# Patient Record
Sex: FEMALE | Race: WHITE | NOT HISPANIC OR LATINO
[De-identification: names, ages, dates, MRNs, and addresses within clinical notes are randomized per-mention and may not be internally consistent; named-entity substitution may affect disease eponyms.]

---

## 2017-07-14 RX ORDER — CARISOPRODOL 250 MG
0 TABLET ORAL
Qty: 30 | Refills: 0 | COMMUNITY
Start: 2017-07-14

## 2017-07-14 RX ORDER — NORTRIPTYLINE HYDROCHLORIDE 10 MG/1
0 CAPSULE ORAL
Qty: 60 | Refills: 0 | COMMUNITY
Start: 2017-07-14

## 2017-08-06 RX ORDER — CELECOXIB 200 MG/1
0 CAPSULE ORAL
Qty: 35 | Refills: 0 | COMMUNITY
Start: 2017-08-06

## 2017-08-09 VITALS
RESPIRATION RATE: 16 BRPM | OXYGEN SATURATION: 98 % | HEART RATE: 121 BPM | SYSTOLIC BLOOD PRESSURE: 140 MMHG | TEMPERATURE: 100 F | DIASTOLIC BLOOD PRESSURE: 82 MMHG

## 2017-08-09 PROCEDURE — 74177 CT ABD & PELVIS W/CONTRAST: CPT | Mod: 26

## 2017-08-09 RX ORDER — MEROPENEM 1 G/30ML
INJECTION INTRAVENOUS
Qty: 0 | Refills: 0 | Status: DISCONTINUED | OUTPATIENT
Start: 2017-08-09 | End: 2017-08-11

## 2017-08-09 RX ORDER — KETOROLAC TROMETHAMINE 30 MG/ML
30 SYRINGE (ML) INJECTION ONCE
Qty: 0 | Refills: 0 | Status: COMPLETED | OUTPATIENT
Start: 2017-08-09 | End: 2017-08-09

## 2017-08-09 RX ORDER — MEROPENEM 1 G/30ML
1000 INJECTION INTRAVENOUS EVERY 8 HOURS
Qty: 0 | Refills: 0 | Status: DISCONTINUED | OUTPATIENT
Start: 2017-08-10 | End: 2017-08-11

## 2017-08-09 RX ORDER — MEROPENEM 1 G/30ML
1000 INJECTION INTRAVENOUS ONCE
Qty: 0 | Refills: 0 | Status: DISCONTINUED | OUTPATIENT
Start: 2017-08-09 | End: 2017-08-11

## 2017-08-10 ENCOUNTER — TRANSCRIPTION ENCOUNTER (OUTPATIENT)
Age: 42
End: 2017-08-10

## 2017-08-10 ENCOUNTER — OUTPATIENT (OUTPATIENT)
Dept: EMERGENCY DEPT | Facility: HOSPITAL | Age: 42
LOS: 1 days | End: 2017-08-10
Payer: MEDICARE

## 2017-08-10 DIAGNOSIS — Q76.1 KLIPPEL-FEIL SYNDROME: Chronic | ICD-10-CM

## 2017-08-10 DIAGNOSIS — A41.9 SEPSIS, UNSPECIFIED ORGANISM: ICD-10-CM

## 2017-08-10 DIAGNOSIS — N20.0 CALCULUS OF KIDNEY: ICD-10-CM

## 2017-08-10 DIAGNOSIS — Z98.890 OTHER SPECIFIED POSTPROCEDURAL STATES: Chronic | ICD-10-CM

## 2017-08-10 LAB
ANION GAP SERPL CALC-SCNC: 15 MMOL/L — SIGNIFICANT CHANGE UP (ref 5–17)
BUN SERPL-MCNC: 8 MG/DL — SIGNIFICANT CHANGE UP (ref 7–23)
CALCIUM SERPL-MCNC: 8 MG/DL — LOW (ref 8.4–10.5)
CHLORIDE SERPL-SCNC: 105 MMOL/L — SIGNIFICANT CHANGE UP (ref 96–108)
CO2 SERPL-SCNC: 21 MMOL/L — LOW (ref 22–31)
CREAT SERPL-MCNC: 0.73 MG/DL — SIGNIFICANT CHANGE UP (ref 0.5–1.3)
GLUCOSE SERPL-MCNC: 120 MG/DL — HIGH (ref 70–99)
HCT VFR BLD CALC: 33.5 % — LOW (ref 34.5–45)
HGB BLD-MCNC: 11.6 G/DL — SIGNIFICANT CHANGE UP (ref 11.5–15.5)
MCHC RBC-ENTMCNC: 30.9 PG — SIGNIFICANT CHANGE UP (ref 27–34)
MCHC RBC-ENTMCNC: 34.8 GM/DL — SIGNIFICANT CHANGE UP (ref 32–36)
MCV RBC AUTO: 88.8 FL — SIGNIFICANT CHANGE UP (ref 80–100)
PLATELET # BLD AUTO: 186 K/UL — SIGNIFICANT CHANGE UP (ref 150–400)
POTASSIUM SERPL-MCNC: 3.6 MMOL/L — SIGNIFICANT CHANGE UP (ref 3.5–5.3)
POTASSIUM SERPL-SCNC: 3.6 MMOL/L — SIGNIFICANT CHANGE UP (ref 3.5–5.3)
RBC # BLD: 3.77 M/UL — LOW (ref 3.8–5.2)
RBC # FLD: 11.2 % — SIGNIFICANT CHANGE UP (ref 10.3–14.5)
SODIUM SERPL-SCNC: 141 MMOL/L — SIGNIFICANT CHANGE UP (ref 135–145)
WBC # BLD: 9.8 K/UL — SIGNIFICANT CHANGE UP (ref 3.8–10.5)
WBC # FLD AUTO: 9.8 K/UL — SIGNIFICANT CHANGE UP (ref 3.8–10.5)

## 2017-08-10 PROCEDURE — 71010: CPT | Mod: 26

## 2017-08-10 RX ORDER — TAMSULOSIN HYDROCHLORIDE 0.4 MG/1
0.4 CAPSULE ORAL DAILY
Qty: 0 | Refills: 0 | Status: DISCONTINUED | OUTPATIENT
Start: 2017-08-09 | End: 2017-08-11

## 2017-08-10 RX ORDER — HYDROMORPHONE HYDROCHLORIDE 2 MG/ML
2 INJECTION INTRAMUSCULAR; INTRAVENOUS; SUBCUTANEOUS
Qty: 0 | Refills: 0 | Status: DISCONTINUED | OUTPATIENT
Start: 2017-08-10 | End: 2017-08-11

## 2017-08-10 RX ORDER — ACETAMINOPHEN 500 MG
1000 TABLET ORAL ONCE
Qty: 0 | Refills: 0 | Status: COMPLETED | OUTPATIENT
Start: 2017-08-10 | End: 2017-08-10

## 2017-08-10 RX ORDER — ZOLPIDEM TARTRATE 10 MG/1
5 TABLET ORAL AT BEDTIME
Qty: 0 | Refills: 0 | Status: DISCONTINUED | OUTPATIENT
Start: 2017-08-09 | End: 2017-08-11

## 2017-08-10 RX ORDER — OXYCODONE AND ACETAMINOPHEN 5; 325 MG/1; MG/1
1 TABLET ORAL EVERY 4 HOURS
Qty: 0 | Refills: 0 | Status: DISCONTINUED | OUTPATIENT
Start: 2017-08-09 | End: 2017-08-10

## 2017-08-10 RX ORDER — CELECOXIB 200 MG/1
200 CAPSULE ORAL
Qty: 0 | Refills: 0 | Status: DISCONTINUED | OUTPATIENT
Start: 2017-08-10 | End: 2017-08-11

## 2017-08-10 RX ORDER — CYCLOBENZAPRINE HYDROCHLORIDE 10 MG/1
5 TABLET, FILM COATED ORAL ONCE
Qty: 0 | Refills: 0 | Status: COMPLETED | OUTPATIENT
Start: 2017-08-10 | End: 2017-08-10

## 2017-08-10 RX ORDER — HYDROMORPHONE HYDROCHLORIDE 2 MG/ML
0.5 INJECTION INTRAMUSCULAR; INTRAVENOUS; SUBCUTANEOUS
Qty: 0 | Refills: 0 | Status: DISCONTINUED | OUTPATIENT
Start: 2017-08-10 | End: 2017-08-10

## 2017-08-10 RX ORDER — ONDANSETRON 8 MG/1
4 TABLET, FILM COATED ORAL ONCE
Qty: 0 | Refills: 0 | Status: DISCONTINUED | OUTPATIENT
Start: 2017-08-10 | End: 2017-08-10

## 2017-08-10 RX ORDER — HYDROMORPHONE HYDROCHLORIDE 2 MG/ML
1 INJECTION INTRAMUSCULAR; INTRAVENOUS; SUBCUTANEOUS EVERY 4 HOURS
Qty: 0 | Refills: 0 | Status: DISCONTINUED | OUTPATIENT
Start: 2017-08-09 | End: 2017-08-11

## 2017-08-10 RX ORDER — NORTRIPTYLINE HYDROCHLORIDE 10 MG/1
50 CAPSULE ORAL AT BEDTIME
Qty: 0 | Refills: 0 | Status: DISCONTINUED | OUTPATIENT
Start: 2017-08-10 | End: 2017-08-11

## 2017-08-10 RX ORDER — OXYCODONE AND ACETAMINOPHEN 5; 325 MG/1; MG/1
2 TABLET ORAL EVERY 6 HOURS
Qty: 0 | Refills: 0 | Status: DISCONTINUED | OUTPATIENT
Start: 2017-08-09 | End: 2017-08-10

## 2017-08-10 RX ORDER — DEXTROSE MONOHYDRATE, SODIUM CHLORIDE, AND POTASSIUM CHLORIDE 50; .745; 4.5 G/1000ML; G/1000ML; G/1000ML
1000 INJECTION, SOLUTION INTRAVENOUS
Qty: 0 | Refills: 0 | Status: DISCONTINUED | OUTPATIENT
Start: 2017-08-09 | End: 2017-08-11

## 2017-08-10 RX ORDER — NORTRIPTYLINE HYDROCHLORIDE 10 MG/1
25 CAPSULE ORAL AT BEDTIME
Qty: 0 | Refills: 0 | Status: DISCONTINUED | OUTPATIENT
Start: 2017-08-10 | End: 2017-08-10

## 2017-08-10 RX ORDER — ONDANSETRON 8 MG/1
4 TABLET, FILM COATED ORAL EVERY 6 HOURS
Qty: 0 | Refills: 0 | Status: DISCONTINUED | OUTPATIENT
Start: 2017-08-09 | End: 2017-08-11

## 2017-08-10 RX ADMIN — Medication 400 MILLIGRAM(S): at 04:35

## 2017-08-10 RX ADMIN — Medication 1000 MILLIGRAM(S): at 04:37

## 2017-08-10 RX ADMIN — Medication 1000 MILLIGRAM(S): at 16:03

## 2017-08-10 RX ADMIN — NORTRIPTYLINE HYDROCHLORIDE 50 MILLIGRAM(S): 10 CAPSULE ORAL at 22:38

## 2017-08-10 RX ADMIN — MEROPENEM 200 MILLIGRAM(S): 1 INJECTION INTRAVENOUS at 22:06

## 2017-08-10 RX ADMIN — CELECOXIB 200 MILLIGRAM(S): 200 CAPSULE ORAL at 22:38

## 2017-08-10 RX ADMIN — Medication 400 MILLIGRAM(S): at 15:33

## 2017-08-10 RX ADMIN — DEXTROSE MONOHYDRATE, SODIUM CHLORIDE, AND POTASSIUM CHLORIDE 150 MILLILITER(S): 50; .745; 4.5 INJECTION, SOLUTION INTRAVENOUS at 05:25

## 2017-08-10 RX ADMIN — MEROPENEM 200 MILLIGRAM(S): 1 INJECTION INTRAVENOUS at 12:52

## 2017-08-10 RX ADMIN — CELECOXIB 200 MILLIGRAM(S): 200 CAPSULE ORAL at 23:00

## 2017-08-10 RX ADMIN — TAMSULOSIN HYDROCHLORIDE 0.4 MILLIGRAM(S): 0.4 CAPSULE ORAL at 12:52

## 2017-08-10 RX ADMIN — CYCLOBENZAPRINE HYDROCHLORIDE 5 MILLIGRAM(S): 10 TABLET, FILM COATED ORAL at 22:38

## 2017-08-10 RX ADMIN — MEROPENEM 200 MILLIGRAM(S): 1 INJECTION INTRAVENOUS at 05:25

## 2017-08-10 NOTE — H&P ADULT - HISTORY OF PRESENT ILLNESS
43 y/o F w. h/o renal stones in past.  pt. c/o left flank pain since Sat. Nause, no vomit, low grade fevers, cloudy and foul smelling urine.    pt instructed by  GYN to go to hospital.    pt found to have a left 6mm UVJ stone and T- 102.5

## 2017-08-10 NOTE — PROGRESS NOTE ADULT - SUBJECTIVE AND OBJECTIVE BOX
UROLOGY POC/DAILY PROGRESS NOTE:     Subjective: Patient seen and examined at bedside.       Objective:  Vital signs  T(F): , Max: 100.3 (08-09-17 @ 17:50)  HR: 102 (08-10-17 @ 06:29)  BP: 113/75 (08-10-17 @ 06:29)  SpO2: 97% (08-10-17 @ 06:29)  Wt(kg): --    Output     I&O's Detail    09 Aug 2017 07:01  -  10 Aug 2017 07:00  --------------------------------------------------------  IN:    IV PiggyBack: 200 mL    sodium chloride 0.9% with potassium chloride 20 mEq/L: 675 mL  Total IN: 875 mL    OUT:    Voided: 1700 mL  Total OUT: 1700 mL    Total NET: -825 mL          Physical Exam:  Gen: NAD  Abd: sift NTND  Back: minimal CVAT     Labs:    08-10    x     / x     /0.73     08-10    9.8   / 33.5<L> /x                              11.6   9.8   )-----------( 186      ( 10 Aug 2017 03:55 )             33.5     08-10    141  |  105  |  8   ----------------------------<  120<H>  3.6   |  21<L>  |  0.73    Ca    8.0<L>      10 Aug 2017 03:56    TPro  7.8  /  Alb  4.4  /  TBili  1.1  /  DBili  x   /  AST  22  /  ALT  18  /  AlkPhos  59  08-09    PT/INR - ( 09 Aug 2017 16:55 )   PT: 11.5 sec;   INR: 1.05 ratio         PTT - ( 09 Aug 2017 16:55 )  PTT:30.8 sec      Urine Cx:

## 2017-08-10 NOTE — H&P ADULT - PSH
Cervical fusion syndrome  cervical fusion  History of cervical discectomy    History of ERCP  2005  S/P  Section  x 2, ,  S/P cholecystectomy  2005  S/P knee surgery  1 arthroscopic

## 2017-08-10 NOTE — PROGRESS NOTE ADULT - PROBLEM SELECTOR PLAN 1
s/p stent  IV hydration  flomax  IV antibiotics  f/u cx   f/u labs  DVT prophylaxis  pain management

## 2017-08-10 NOTE — H&P ADULT - PMH
Cervical disc displacement    Lumbar disc disease  3 bulging disc an d 2 herniated disc  Renal stone

## 2017-08-11 ENCOUNTER — TRANSCRIPTION ENCOUNTER (OUTPATIENT)
Age: 42
End: 2017-08-11

## 2017-08-11 VITALS
DIASTOLIC BLOOD PRESSURE: 79 MMHG | TEMPERATURE: 99 F | HEART RATE: 100 BPM | RESPIRATION RATE: 18 BRPM | OXYGEN SATURATION: 99 % | SYSTOLIC BLOOD PRESSURE: 134 MMHG

## 2017-08-11 PROCEDURE — 82435 ASSAY OF BLOOD CHLORIDE: CPT

## 2017-08-11 PROCEDURE — 76000 FLUOROSCOPY <1 HR PHYS/QHP: CPT

## 2017-08-11 PROCEDURE — 85014 HEMATOCRIT: CPT

## 2017-08-11 PROCEDURE — 83605 ASSAY OF LACTIC ACID: CPT

## 2017-08-11 PROCEDURE — 82803 BLOOD GASES ANY COMBINATION: CPT

## 2017-08-11 PROCEDURE — 84295 ASSAY OF SERUM SODIUM: CPT

## 2017-08-11 PROCEDURE — C1769: CPT

## 2017-08-11 PROCEDURE — 86850 RBC ANTIBODY SCREEN: CPT

## 2017-08-11 PROCEDURE — 80048 BASIC METABOLIC PNL TOTAL CA: CPT

## 2017-08-11 PROCEDURE — 84702 CHORIONIC GONADOTROPIN TEST: CPT

## 2017-08-11 PROCEDURE — 82330 ASSAY OF CALCIUM: CPT

## 2017-08-11 PROCEDURE — 87186 SC STD MICRODIL/AGAR DIL: CPT

## 2017-08-11 PROCEDURE — 74177 CT ABD & PELVIS W/CONTRAST: CPT

## 2017-08-11 PROCEDURE — 71045 X-RAY EXAM CHEST 1 VIEW: CPT

## 2017-08-11 PROCEDURE — 85027 COMPLETE CBC AUTOMATED: CPT

## 2017-08-11 PROCEDURE — 86901 BLOOD TYPING SEROLOGIC RH(D): CPT

## 2017-08-11 PROCEDURE — 81001 URINALYSIS AUTO W/SCOPE: CPT

## 2017-08-11 PROCEDURE — 86900 BLOOD TYPING SEROLOGIC ABO: CPT

## 2017-08-11 PROCEDURE — 84132 ASSAY OF SERUM POTASSIUM: CPT

## 2017-08-11 PROCEDURE — C2617: CPT

## 2017-08-11 PROCEDURE — C1758: CPT

## 2017-08-11 PROCEDURE — 80053 COMPREHEN METABOLIC PANEL: CPT

## 2017-08-11 PROCEDURE — 87086 URINE CULTURE/COLONY COUNT: CPT

## 2017-08-11 PROCEDURE — 52332 CYSTOSCOPY AND TREATMENT: CPT | Mod: LT

## 2017-08-11 PROCEDURE — 85610 PROTHROMBIN TIME: CPT

## 2017-08-11 PROCEDURE — 85730 THROMBOPLASTIN TIME PARTIAL: CPT

## 2017-08-11 PROCEDURE — 82947 ASSAY GLUCOSE BLOOD QUANT: CPT

## 2017-08-11 PROCEDURE — 87040 BLOOD CULTURE FOR BACTERIA: CPT

## 2017-08-11 RX ORDER — BENZOCAINE AND MENTHOL 5; 1 G/100ML; G/100ML
1 LIQUID ORAL ONCE
Qty: 0 | Refills: 0 | Status: COMPLETED | OUTPATIENT
Start: 2017-08-11 | End: 2017-08-11

## 2017-08-11 RX ORDER — TAMSULOSIN HYDROCHLORIDE 0.4 MG/1
1 CAPSULE ORAL
Qty: 14 | Refills: 0 | OUTPATIENT
Start: 2017-08-11 | End: 2017-08-25

## 2017-08-11 RX ORDER — MOXIFLOXACIN HYDROCHLORIDE TABLETS, 400 MG 400 MG/1
1 TABLET, FILM COATED ORAL
Qty: 28 | Refills: 0 | OUTPATIENT
Start: 2017-08-11 | End: 2017-08-25

## 2017-08-11 RX ORDER — HEPARIN SODIUM 5000 [USP'U]/ML
5000 INJECTION INTRAVENOUS; SUBCUTANEOUS EVERY 8 HOURS
Qty: 0 | Refills: 0 | Status: DISCONTINUED | OUTPATIENT
Start: 2017-08-11 | End: 2017-08-11

## 2017-08-11 RX ADMIN — BENZOCAINE AND MENTHOL 1 LOZENGE: 5; 1 LIQUID ORAL at 12:12

## 2017-08-11 RX ADMIN — HYDROMORPHONE HYDROCHLORIDE 2 MILLIGRAM(S): 2 INJECTION INTRAMUSCULAR; INTRAVENOUS; SUBCUTANEOUS at 12:12

## 2017-08-11 RX ADMIN — MEROPENEM 200 MILLIGRAM(S): 1 INJECTION INTRAVENOUS at 14:03

## 2017-08-11 RX ADMIN — HEPARIN SODIUM 5000 UNIT(S): 5000 INJECTION INTRAVENOUS; SUBCUTANEOUS at 05:44

## 2017-08-11 RX ADMIN — TAMSULOSIN HYDROCHLORIDE 0.4 MILLIGRAM(S): 0.4 CAPSULE ORAL at 12:12

## 2017-08-11 RX ADMIN — HEPARIN SODIUM 5000 UNIT(S): 5000 INJECTION INTRAVENOUS; SUBCUTANEOUS at 14:03

## 2017-08-11 RX ADMIN — HYDROMORPHONE HYDROCHLORIDE 2 MILLIGRAM(S): 2 INJECTION INTRAMUSCULAR; INTRAVENOUS; SUBCUTANEOUS at 12:42

## 2017-08-11 RX ADMIN — MEROPENEM 200 MILLIGRAM(S): 1 INJECTION INTRAVENOUS at 05:44

## 2017-08-11 NOTE — DISCHARGE NOTE ADULT - CARE PROVIDER_API CALL
Royal Benz), Urology  [address] [place] [address]  Phone: [phone]  Fax: [phone]

## 2017-08-11 NOTE — DISCHARGE NOTE ADULT - INSTRUCTIONS
Regular diet If unable to tolerate diet, nausea, vomiting, fever above 100.3, chills, or an increase in pain, notify provider or return to ER.

## 2017-08-11 NOTE — PROGRESS NOTE ADULT - SUBJECTIVE AND OBJECTIVE BOX
Subjective  c/o sore throat  minimal voiding symptoms    Objective  afebrile     Vital signs  T(F): , Max: 99.1 (08-11-17 @ 01:12)  HR: 90 (08-11-17 @ 05:26)  BP: 105/68 (08-11-17 @ 05:26)  SpO2: 97% (08-11-17 @ 05:26)  Wt(kg): --    Output     08-10 @ 07:01  -  08-11 @ 07:00  --------------------------------------------------------  IN: 4560 mL / OUT: 1600 mL / NET: 2960 mL        Gen  nad  Abd  soft    no cvat    Labs      08-10 @ 03:56    WBC --    / Hct --    / SCr 0.73     08-10 @ 03:55    WBC 9.8   / Hct 33.5  / SCr --

## 2017-08-11 NOTE — DISCHARGE NOTE ADULT - PLAN OF CARE
s/p left ureteral stent placement Regular diet.  Please follow up with Dr Benz in office [phone] for definitive stone management.

## 2017-08-11 NOTE — DISCHARGE NOTE ADULT - HOSPITAL COURSE
This is a 42 year old female who presented with This is a 42 year old female who presented with left sided flank pain, and foul smelling urine, and was found to have a 6mm left ureteral calculus.  She developed a fever of 102F and was admitted, and underwent a left ureteral stent 8/10.  She remained stable post operatively, tolerating regular diet, and having GI function.  She was discharged home with appropriate antibiotics and instructions for follow up.

## 2017-08-11 NOTE — DISCHARGE NOTE ADULT - MEDICATION SUMMARY - MEDICATIONS TO TAKE
I will START or STAY ON the medications listed below when I get home from the hospital:    CELECOXIB    CAP 200MG  -- Indication: For Home medication    Flomax 0.4 mg oral capsule  -- 1 cap(s) by mouth once a day (at bedtime)  -- Indication: For To help relieve stent pain    NORTRIPTYLIN CAP 25MG  -- Indication: For Home medication    CARISOPRODOL TAB 350MG  -- Indication: For Home medication    Cipro 500 mg oral tablet  -- 1 tab(s) by mouth every 12 hours  -- Avoid prolonged or excessive exposure to direct and/or artificial sunlight while taking this medication.  Check with your doctor before becoming pregnant.  Do not take dairy products, antacids, or iron preparations within one hour of this medication.  Finish all this medication unless otherwise directed by prescriber.  Medication should be taken with plenty of water.    -- Indication: For Antibiotics for UTI

## 2017-08-11 NOTE — PROGRESS NOTE ADULT - SUBJECTIVE AND OBJECTIVE BOX
s: stent colic  O avss ucx 100k e coli sn pending  A s/p stent for febrile stone  P interval outpatient ureteroscopy

## 2017-08-11 NOTE — DISCHARGE NOTE ADULT - CARE PLAN
Principal Discharge DX:	Nephrolithiasis  Goal:	s/p left ureteral stent placement  Instructions for follow-up, activity and diet:	Regular diet.  Please follow up with Dr Benz in office [phone] for definitive stone management. Principal Discharge DX:	Nephrolithiasis  Goal:	s/p left ureteral stent placement  Instructions for follow-up, activity and diet:	Regular diet.  Please follow up with Dr Benz in office [phone] for definitive stone management. Principal Discharge DX:	Nephrolithiasis  Goal:	s/p left ureteral stent placement  Instructions for follow-up, activity and diet:	Regular diet.  Please follow up with Dr Benz in office [phone] for definitive stone management. Principal Discharge DX:	Nephrolithiasis  Goal:	s/p left ureteral stent placement  Instructions for follow-up, activity and diet:	Regular diet.  Please follow up with Dr Benz in office [phone] for definitive stone management.

## 2017-08-11 NOTE — PROGRESS NOTE ADULT - SUBJECTIVE AND OBJECTIVE BOX
The patient was seen and examined at bedside.  Denies complaints of chest pain, shortness of breath, nausea, acute pain.    T(C): 36.9 (08-11-17 @ 05:26), Max: 37.3 (08-11-17 @ 01:12)  HR: 90 (08-11-17 @ 05:26) (76 - 106)  BP: 105/68 (08-11-17 @ 05:26) (102/64 - 132/84)  RR: 18 (08-11-17 @ 05:26) (16 - 18)  SpO2: 97% (08-11-17 @ 05:26) (96% - 98%)  Wt(kg): --    Physical Exam:    General: NAD, A+Ox3  Abdomen: soft, non-tender, non-distended  Back: dressing clean/dry/intact      08-10 @ 07:01  -  08-11 @ 07:00  --------------------------------------------------------  IN: 4560 mL / OUT: 1600 mL / NET: 2960 mL The patient was seen and examined at bedside.  +throat discomfort.  Denies complaints of chest pain, shortness of breath, nausea, acute pain.    T(C): 36.9 (08-11-17 @ 05:26), Max: 37.3 (08-11-17 @ 01:12)  HR: 90 (08-11-17 @ 05:26) (76 - 106)  BP: 105/68 (08-11-17 @ 05:26) (102/64 - 132/84)  RR: 18 (08-11-17 @ 05:26) (16 - 18)  SpO2: 97% (08-11-17 @ 05:26) (96% - 98%)  Wt(kg): --    Physical Exam:    General: NAD, A+Ox3  Abdomen: soft, non-tender, non-distended  Back: dressing clean/dry/intact      08-10 @ 07:01  -  08-11 @ 07:00  --------------------------------------------------------  IN: 4560 mL / OUT: 1600 mL / NET: 2960 mL The patient was seen and examined at bedside.  +throat discomfort.  Denies complaints of chest pain, shortness of breath, nausea, acute pain.    T(C): 36.9 (08-11-17 @ 05:26), Max: 37.3 (08-11-17 @ 01:12)  HR: 90 (08-11-17 @ 05:26) (76 - 106)  BP: 105/68 (08-11-17 @ 05:26) (102/64 - 132/84)  RR: 18 (08-11-17 @ 05:26) (16 - 18)  SpO2: 97% (08-11-17 @ 05:26) (96% - 98%)  Wt(kg): --    Physical Exam:    General: NAD, A+Ox3  Abdomen: soft, non-tender, non-distended      08-10 @ 07:01  -  08-11 @ 07:00  --------------------------------------------------------  IN: 4560 mL / OUT: 1600 mL / NET: 2960 mL

## 2017-08-11 NOTE — PROGRESS NOTE ADULT - ASSESSMENT
stable post stent for outpatient management as discussed.   Outpatient gu fu  discharge instructions given
42y Female s/p left ureteral stent for a ureteral calculus in setting of urinary tract infection
Febrile L ureteral calculi s/p L ureteral stent

## 2017-08-12 ENCOUNTER — TRANSCRIPTION ENCOUNTER (OUTPATIENT)
Age: 42
End: 2017-08-12

## 2017-11-21 ENCOUNTER — APPOINTMENT (OUTPATIENT)
Dept: ULTRASOUND IMAGING | Facility: IMAGING CENTER | Age: 42
End: 2017-11-21
Payer: MEDICARE

## 2017-11-21 ENCOUNTER — APPOINTMENT (OUTPATIENT)
Dept: MAMMOGRAPHY | Facility: IMAGING CENTER | Age: 42
End: 2017-11-21
Payer: MEDICARE

## 2017-11-21 ENCOUNTER — OUTPATIENT (OUTPATIENT)
Dept: OUTPATIENT SERVICES | Facility: HOSPITAL | Age: 42
LOS: 1 days | End: 2017-11-21
Payer: MEDICARE

## 2017-11-21 DIAGNOSIS — R92.2 INCONCLUSIVE MAMMOGRAM: ICD-10-CM

## 2017-11-21 DIAGNOSIS — Z98.890 OTHER SPECIFIED POSTPROCEDURAL STATES: Chronic | ICD-10-CM

## 2017-11-21 DIAGNOSIS — R92.8 OTHER ABNORMAL AND INCONCLUSIVE FINDINGS ON DIAGNOSTIC IMAGING OF BREAST: ICD-10-CM

## 2017-11-21 DIAGNOSIS — Q76.1 KLIPPEL-FEIL SYNDROME: Chronic | ICD-10-CM

## 2017-11-21 DIAGNOSIS — Z00.8 ENCOUNTER FOR OTHER GENERAL EXAMINATION: ICD-10-CM

## 2017-11-21 PROCEDURE — 76641 ULTRASOUND BREAST COMPLETE: CPT | Mod: 26,50

## 2017-11-21 PROCEDURE — G0279: CPT | Mod: 26

## 2017-11-21 PROCEDURE — G0204: CPT | Mod: 26

## 2017-11-21 PROCEDURE — 77066 DX MAMMO INCL CAD BI: CPT

## 2017-11-21 PROCEDURE — G0279: CPT

## 2017-11-21 PROCEDURE — 76641 ULTRASOUND BREAST COMPLETE: CPT

## 2017-11-28 DIAGNOSIS — N60.12 DIFFUSE CYSTIC MASTOPATHY OF LEFT BREAST: ICD-10-CM

## 2017-11-28 DIAGNOSIS — N60.11 DIFFUSE CYSTIC MASTOPATHY OF RIGHT BREAST: ICD-10-CM

## 2017-11-28 DIAGNOSIS — Z80.3 FAMILY HISTORY OF MALIGNANT NEOPLASM OF BREAST: ICD-10-CM

## 2017-11-28 DIAGNOSIS — N64.52 NIPPLE DISCHARGE: ICD-10-CM

## 2018-11-06 ENCOUNTER — APPOINTMENT (OUTPATIENT)
Dept: GASTROENTEROLOGY | Facility: CLINIC | Age: 43
End: 2018-11-06
Payer: MEDICARE

## 2018-11-06 VITALS
RESPIRATION RATE: 16 BRPM | OXYGEN SATURATION: 97 % | BODY MASS INDEX: 31.36 KG/M2 | WEIGHT: 177 LBS | HEART RATE: 94 BPM | DIASTOLIC BLOOD PRESSURE: 78 MMHG | HEIGHT: 63 IN | TEMPERATURE: 98.1 F | SYSTOLIC BLOOD PRESSURE: 126 MMHG

## 2018-11-06 DIAGNOSIS — Z78.9 OTHER SPECIFIED HEALTH STATUS: ICD-10-CM

## 2018-11-06 DIAGNOSIS — Z87.39 PERSONAL HISTORY OF OTHER DISEASES OF THE MUSCULOSKELETAL SYSTEM AND CONNECTIVE TISSUE: ICD-10-CM

## 2018-11-06 PROCEDURE — 99204 OFFICE O/P NEW MOD 45 MIN: CPT

## 2018-11-23 ENCOUNTER — OUTPATIENT (OUTPATIENT)
Dept: OUTPATIENT SERVICES | Facility: HOSPITAL | Age: 43
LOS: 1 days | End: 2018-11-23
Payer: MEDICARE

## 2018-11-23 ENCOUNTER — APPOINTMENT (OUTPATIENT)
Dept: MAMMOGRAPHY | Facility: CLINIC | Age: 43
End: 2018-11-23
Payer: MEDICARE

## 2018-11-23 ENCOUNTER — APPOINTMENT (OUTPATIENT)
Dept: ULTRASOUND IMAGING | Facility: CLINIC | Age: 43
End: 2018-11-23
Payer: MEDICARE

## 2018-11-23 DIAGNOSIS — Q76.1 KLIPPEL-FEIL SYNDROME: Chronic | ICD-10-CM

## 2018-11-23 DIAGNOSIS — R92.2 INCONCLUSIVE MAMMOGRAM: ICD-10-CM

## 2018-11-23 DIAGNOSIS — Z12.31 ENCOUNTER FOR SCREENING MAMMOGRAM FOR MALIGNANT NEOPLASM OF BREAST: ICD-10-CM

## 2018-11-23 DIAGNOSIS — Z98.890 OTHER SPECIFIED POSTPROCEDURAL STATES: Chronic | ICD-10-CM

## 2018-11-23 PROCEDURE — 77063 BREAST TOMOSYNTHESIS BI: CPT

## 2018-11-23 PROCEDURE — 77067 SCR MAMMO BI INCL CAD: CPT

## 2018-11-23 PROCEDURE — 76641 ULTRASOUND BREAST COMPLETE: CPT | Mod: 26,50

## 2018-11-23 PROCEDURE — 77067 SCR MAMMO BI INCL CAD: CPT | Mod: 26

## 2018-11-23 PROCEDURE — 76641 ULTRASOUND BREAST COMPLETE: CPT

## 2018-11-23 PROCEDURE — 77063 BREAST TOMOSYNTHESIS BI: CPT | Mod: 26

## 2018-12-06 ENCOUNTER — OUTPATIENT (OUTPATIENT)
Dept: OUTPATIENT SERVICES | Facility: HOSPITAL | Age: 43
LOS: 1 days | Discharge: HOME | End: 2018-12-06
Payer: MEDICARE

## 2018-12-06 ENCOUNTER — APPOINTMENT (OUTPATIENT)
Age: 43
End: 2018-12-06

## 2018-12-06 DIAGNOSIS — K21.9 GASTRO-ESOPHAGEAL REFLUX DISEASE WITHOUT ESOPHAGITIS: ICD-10-CM

## 2018-12-06 DIAGNOSIS — Q76.1 KLIPPEL-FEIL SYNDROME: Chronic | ICD-10-CM

## 2018-12-06 DIAGNOSIS — Z98.890 OTHER SPECIFIED POSTPROCEDURAL STATES: Chronic | ICD-10-CM

## 2018-12-06 PROCEDURE — 91037 ESOPH IMPED FUNCTION TEST: CPT | Mod: 26

## 2018-12-06 PROCEDURE — 91010 ESOPHAGUS MOTILITY STUDY: CPT | Mod: 26

## 2018-12-12 ENCOUNTER — OUTPATIENT (OUTPATIENT)
Dept: OUTPATIENT SERVICES | Facility: HOSPITAL | Age: 43
LOS: 1 days | Discharge: HOME | End: 2018-12-12
Payer: MEDICARE

## 2018-12-12 ENCOUNTER — APPOINTMENT (OUTPATIENT)
Age: 43
End: 2018-12-12
Payer: MEDICARE

## 2018-12-12 ENCOUNTER — RESULT REVIEW (OUTPATIENT)
Age: 43
End: 2018-12-12

## 2018-12-12 DIAGNOSIS — K31.7 POLYP OF STOMACH AND DUODENUM: ICD-10-CM

## 2018-12-12 DIAGNOSIS — K21.9 GASTRO-ESOPHAGEAL REFLUX DISEASE WITHOUT ESOPHAGITIS: ICD-10-CM

## 2018-12-12 DIAGNOSIS — Z98.890 OTHER SPECIFIED POSTPROCEDURAL STATES: Chronic | ICD-10-CM

## 2018-12-12 DIAGNOSIS — K20.0 EOSINOPHILIC ESOPHAGITIS: ICD-10-CM

## 2018-12-12 DIAGNOSIS — K44.9 DIAPHRAGMATIC HERNIA WITHOUT OBSTRUCTION OR GANGRENE: ICD-10-CM

## 2018-12-12 DIAGNOSIS — Q76.1 KLIPPEL-FEIL SYNDROME: Chronic | ICD-10-CM

## 2018-12-12 PROCEDURE — 88305 TISSUE EXAM BY PATHOLOGIST: CPT

## 2018-12-12 PROCEDURE — 91035 G-ESOPH REFLX TST W/ELECTROD: CPT

## 2018-12-12 PROCEDURE — 43239 EGD BIOPSY SINGLE/MULTIPLE: CPT

## 2018-12-12 PROCEDURE — 88312 SPECIAL STAINS GROUP 1: CPT

## 2018-12-12 PROCEDURE — 91010 ESOPHAGUS MOTILITY STUDY: CPT

## 2018-12-12 PROCEDURE — 88305 TISSUE EXAM BY PATHOLOGIST: CPT | Mod: 26

## 2018-12-12 PROCEDURE — 88312 SPECIAL STAINS GROUP 1: CPT | Mod: 26

## 2018-12-13 ENCOUNTER — OTHER (OUTPATIENT)
Age: 43
End: 2018-12-13

## 2018-12-17 PROCEDURE — 91035 G-ESOPH REFLX TST W/ELECTROD: CPT | Mod: 26

## 2018-12-28 ENCOUNTER — RESULT REVIEW (OUTPATIENT)
Age: 43
End: 2018-12-28

## 2019-01-02 ENCOUNTER — OTHER (OUTPATIENT)
Age: 44
End: 2019-01-02

## 2019-01-16 ENCOUNTER — APPOINTMENT (OUTPATIENT)
Dept: GASTROENTEROLOGY | Facility: CLINIC | Age: 44
End: 2019-01-16
Payer: MEDICARE

## 2019-01-16 VITALS
HEIGHT: 63 IN | OXYGEN SATURATION: 98 % | RESPIRATION RATE: 15 BRPM | WEIGHT: 180 LBS | HEART RATE: 94 BPM | SYSTOLIC BLOOD PRESSURE: 124 MMHG | DIASTOLIC BLOOD PRESSURE: 80 MMHG | TEMPERATURE: 98.7 F | BODY MASS INDEX: 31.89 KG/M2

## 2019-01-16 LAB
ALBUMIN SERPL ELPH-MCNC: 4.5 G/DL
ALP BLD-CCNC: 41 U/L
ALT SERPL-CCNC: 14 U/L
ANION GAP SERPL CALC-SCNC: 13 MMOL/L
AST SERPL-CCNC: 16 U/L
BASOPHILS # BLD AUTO: 0.03 K/UL
BASOPHILS NFR BLD AUTO: 0.4 %
BILIRUB SERPL-MCNC: 0.4 MG/DL
BUN SERPL-MCNC: 10 MG/DL
CALCIUM SERPL-MCNC: 10 MG/DL
CHLORIDE SERPL-SCNC: 102 MMOL/L
CO2 SERPL-SCNC: 23 MMOL/L
CREAT SERPL-MCNC: 0.76 MG/DL
EOSINOPHIL # BLD AUTO: 0.05 K/UL
EOSINOPHIL NFR BLD AUTO: 0.7 %
GLUCOSE SERPL-MCNC: 109 MG/DL
HCT VFR BLD CALC: 38.6 %
HGB BLD-MCNC: 12.8 G/DL
IMM GRANULOCYTES NFR BLD AUTO: 0.1 %
LPL SERPL-CCNC: 28 U/L
LYMPHOCYTES # BLD AUTO: 1.51 K/UL
LYMPHOCYTES NFR BLD AUTO: 22.3 %
MAN DIFF?: NORMAL
MCHC RBC-ENTMCNC: 29.3 PG
MCHC RBC-ENTMCNC: 33.2 GM/DL
MCV RBC AUTO: 88.3 FL
MONOCYTES # BLD AUTO: 0.41 K/UL
MONOCYTES NFR BLD AUTO: 6.1 %
NEUTROPHILS # BLD AUTO: 4.76 K/UL
NEUTROPHILS NFR BLD AUTO: 70.4 %
PLATELET # BLD AUTO: 339 K/UL
POTASSIUM SERPL-SCNC: 4.2 MMOL/L
PROT SERPL-MCNC: 7.3 G/DL
RBC # BLD: 4.37 M/UL
RBC # FLD: 12.4 %
SODIUM SERPL-SCNC: 138 MMOL/L
WBC # FLD AUTO: 6.77 K/UL

## 2019-01-16 PROCEDURE — 99214 OFFICE O/P EST MOD 30 MIN: CPT

## 2019-01-16 NOTE — ASSESSMENT
[FreeTextEntry1] : This is a 43-year-old female with chronic GERD with positive symptom correlation on 48-hour pH monitoring, 2 cm hiatal hernia, and irritable bowel syndrome with most probable lactose intolerance. I recommend eating small meals and avoid laying down after eating. I recommend pantoprazole 40 mg to be taken half hour before breakfast. She can take over-the-counter Zantac or Pepcid as needed for breakthrough. I discussed with her surgical options for hernia repair and antireflux surgery. She is concerned about possible long-term complication of PPI therapy. I recommend surgical consultation with thoracic surgery. Contact information was given. For Her IBS, I recommend lactose-free diet and peppermint oil capsule IB FAITH for lower abdominal cramping. She will blood work today including CBC, CMP and lipase. She is to call me if she has questions or concerns.

## 2019-01-16 NOTE — HISTORY OF PRESENT ILLNESS
[FreeTextEntry1] : Elvia Presents for a followup visit. She reports continued heartburn symptoms and reports left-sided abdominal pain not associated with bowel movements. However she does have continued irregular bowel movements alternating between diarrhea and constipation. She admits that she did not completely stop dairy to give a two-week trial on a lactose-free diet. She still has cheese products. She did not try IB FAITH, peppermint oil capsule for abdominal cramps. She continues to have lower abdominal cramping with her irregular bowel movements. I reviewed with the results of esophageal motility study which was normal. 48-hour pH monitoring consistent with GERD with positive symptom correlation. Her upper endoscopy showing a 2 cm hiatal hernia fundic gland polyps and gastritis negative for H. pylori. Esophageal biopsies showing reflux esophagitis without evidence of Gray's.

## 2019-01-16 NOTE — PHYSICAL EXAM
[General Appearance - Alert] : alert [General Appearance - In No Acute Distress] : in no acute distress [Outer Ear] : the ears and nose were normal in appearance [Auscultation Breath Sounds / Voice Sounds] : lungs were clear to auscultation bilaterally [Heart Rate And Rhythm] : heart rate was normal and rhythm regular [Heart Sounds] : normal S1 and S2 [Heart Sounds Gallop] : no gallops [Murmurs] : no murmurs [Heart Sounds Pericardial Friction Rub] : no pericardial rub [Edema] : there was no peripheral edema [Bowel Sounds] : normal bowel sounds [Abdomen Soft] : soft [Abdomen Tenderness] : non-tender [] : no hepato-splenomegaly [Abdomen Mass (___ Cm)] : no abdominal mass palpated [Skin Color & Pigmentation] : normal skin color and pigmentation [No Focal Deficits] : no focal deficits

## 2019-02-06 ENCOUNTER — APPOINTMENT (OUTPATIENT)
Dept: THORACIC SURGERY | Facility: CLINIC | Age: 44
End: 2019-02-06
Payer: MEDICARE

## 2019-02-06 VITALS
DIASTOLIC BLOOD PRESSURE: 87 MMHG | OXYGEN SATURATION: 98 % | TEMPERATURE: 99.2 F | HEART RATE: 91 BPM | RESPIRATION RATE: 16 BRPM | BODY MASS INDEX: 31.89 KG/M2 | HEIGHT: 63 IN | WEIGHT: 180 LBS | SYSTOLIC BLOOD PRESSURE: 145 MMHG

## 2019-02-06 DIAGNOSIS — Z80.3 FAMILY HISTORY OF MALIGNANT NEOPLASM OF BREAST: ICD-10-CM

## 2019-02-06 PROCEDURE — 99205 OFFICE O/P NEW HI 60 MIN: CPT

## 2019-02-07 NOTE — REVIEW OF SYSTEMS
[As Noted in HPI] : as noted in HPI [Abdominal Pain] : abdominal pain [Heartburn] : heartburn [Negative] : Heme/Lymph [Vomiting] : no vomiting [Constipation] : no constipation [Diarrhea] : no diarrhea [FreeTextEntry7] : constant burping and hiccuping, acid reflux and food regurgitation

## 2019-02-07 NOTE — CONSULT LETTER
[Dear  ___] : Dear  [unfilled], [Consult Letter:] : I had the pleasure of evaluating your patient, [unfilled]. [( Thank you for referring [unfilled] for consultation for _____ )] : Thank you for referring [unfilled] for consultation for [unfilled] [Please see my note below.] : Please see my note below. [Consult Closing:] : Thank you very much for allowing me to participate in the care of this patient.  If you have any questions, please do not hesitate to contact me. [Sincerely,] : Sincerely, [DrBlanca  ___] : Dr. LANIER [DrBlanca ___] : Dr. LANIER [FreeTextEntry2] : Eleonora Oquendo MD (GI/referring)\par Dr. Culp(PCP)\par Dr. Jesús Gunn(cardio) [FreeTextEntry3] : Madi Schaeffer MD, FACS \par , Division of Thoracic Surgery \par [facility] \par Chief, Thoracic Surgery \par [facility] \par Department of Cardiovascular & Thoracic Surgery \par  \par [facility] [employer] [facility]

## 2019-02-07 NOTE — ASSESSMENT
[FreeTextEntry1] : 44 yo female, never smoker, with hx of HTN, renal stone s/p stent placement, IBS, cervical discectomy with fusion, Lt breast papilloma resection. Patient presented to GI doctor for heartburn symptoms for years associated with left sided abdominal pain. \par \par Prior studies were reviewed with pt. Pt is symptomatic, I recommended EGD, laparoscopic, hiatal hernia repair. Risks and benefits and alternatives explained to patient, all questions answered, patient agreed to proceed with surgery. Postoperative care and diet reviewed with pt in detail. Prior to surgery, pt need a PST and hold Celebrex for 3 days. \par \par Written by Ellen Guaman NP, acting as a scribe for Dr. Tasneem Tovar. \par \par The documentation recorded by the scribe accurately reflects the service I personally performed and the decisions made by me. TASNEEM TOVAR MD\par \par

## 2019-02-07 NOTE — DATA REVIEWED
[FreeTextEntry1] : Esophageal motility study on 12/6/18 was normal.  And 48-hour pH monitoring consistent with GERD with positive symptom correlation. Her upper endoscopy showing a 2 cm hiatal hernia fundic gland polyps and gastritis negative for H. pylori. Esophageal biopsies showing reflux esophagitis without evidence of Gray's.\par

## 2019-02-07 NOTE — HISTORY OF PRESENT ILLNESS
[FreeTextEntry1] : 44 yo female, never smoker, with hx of HTN, renal stone s/p stent placement, IBS, cervical discectomy with fusion, Lt breast papilloma resection. Patient presented to GI doctor for heartburn symptoms for years associated with left sided abdominal pain. \par \par Esophageal motility study on 12/6/18 was normal. 48-hour pH monitoring consistent with GERD with positive symptom correlation. Her upper endoscopy showing a 2 cm hiatal hernia fundic gland polyps and gastritis negative for H. pylori. Esophageal biopsies showing reflux esophagitis without evidence of Gray's.\par \par Patient is here today for initial thoracic sx consultation referred by GI Dr. Oquendo. Pt reports constant burping and hiccuping, acid reflux, Lt abdominal pain and food regurgitation. Denies N/V, SOB, diarrhea, cough, weight loss, dysphagia, fever or CP.

## 2019-02-07 NOTE — PHYSICAL EXAM
[General Appearance - Alert] : alert [General Appearance - In No Acute Distress] : in no acute distress [Sclera] : the sclera and conjunctiva were normal [Extraocular Movements] : extraocular movements were intact [Outer Ear] : the ears and nose were normal in appearance [Oropharynx] : the oropharynx was normal [Neck Appearance] : the appearance of the neck was normal [Neck Cervical Mass (___cm)] : no neck mass was observed [Jugular Venous Distention Increased] : there was no jugular-venous distention [Thyroid Diffuse Enlargement] : the thyroid was not enlarged [Thyroid Nodule] : there were no palpable thyroid nodules [Auscultation Breath Sounds / Voice Sounds] : lungs were clear to auscultation bilaterally [Heart Rate And Rhythm] : heart rate was normal and rhythm regular [Heart Sounds] : normal S1 and S2 [Heart Sounds Gallop] : no gallops [Murmurs] : no murmurs [Heart Sounds Pericardial Friction Rub] : no pericardial rub [Examination Of The Chest] : the chest was normal in appearance [Chest Visual Inspection Thoracic Asymmetry] : no chest asymmetry [Diminished Respiratory Excursion] : normal chest expansion [2+] : left 2+ [Bowel Sounds] : normal bowel sounds [Abdomen Soft] : soft [Abdomen Tenderness] : non-tender [Abdomen Mass (___ Cm)] : no abdominal mass palpated [Cervical Lymph Nodes Enlarged Posterior Bilaterally] : posterior cervical [Cervical Lymph Nodes Enlarged Anterior Bilaterally] : anterior cervical [Supraclavicular Lymph Nodes Enlarged Bilaterally] : supraclavicular [No CVA Tenderness] : no ~M costovertebral angle tenderness [No Spinal Tenderness] : no spinal tenderness [Abnormal Walk] : normal gait [Nail Clubbing] : no clubbing  or cyanosis of the fingernails [Musculoskeletal - Swelling] : no joint swelling seen [Motor Tone] : muscle strength and tone were normal [Skin Color & Pigmentation] : normal skin color and pigmentation [Skin Turgor] : normal skin turgor [] : no rash [Deep Tendon Reflexes (DTR)] : deep tendon reflexes were 2+ and symmetric [Sensation] : the sensory exam was normal to light touch and pinprick [No Focal Deficits] : no focal deficits [Oriented To Time, Place, And Person] : oriented to person, place, and time [Impaired Insight] : insight and judgment were intact [Affect] : the affect was normal [FreeTextEntry1] : voids without difficulty

## 2019-03-07 ENCOUNTER — OUTPATIENT (OUTPATIENT)
Dept: OUTPATIENT SERVICES | Facility: HOSPITAL | Age: 44
LOS: 1 days | End: 2019-03-07
Payer: MEDICARE

## 2019-03-07 ENCOUNTER — TRANSCRIPTION ENCOUNTER (OUTPATIENT)
Age: 44
End: 2019-03-07

## 2019-03-07 VITALS
SYSTOLIC BLOOD PRESSURE: 130 MMHG | OXYGEN SATURATION: 98 % | HEART RATE: 91 BPM | DIASTOLIC BLOOD PRESSURE: 80 MMHG | HEIGHT: 62 IN | RESPIRATION RATE: 14 BRPM | TEMPERATURE: 99 F | WEIGHT: 182.1 LBS

## 2019-03-07 DIAGNOSIS — K44.9 DIAPHRAGMATIC HERNIA WITHOUT OBSTRUCTION OR GANGRENE: ICD-10-CM

## 2019-03-07 DIAGNOSIS — Q76.1 KLIPPEL-FEIL SYNDROME: Chronic | ICD-10-CM

## 2019-03-07 DIAGNOSIS — Z98.890 OTHER SPECIFIED POSTPROCEDURAL STATES: Chronic | ICD-10-CM

## 2019-03-07 LAB
ANION GAP SERPL CALC-SCNC: 13 MMO/L — SIGNIFICANT CHANGE UP (ref 7–14)
BLD GP AB SCN SERPL QL: NEGATIVE — SIGNIFICANT CHANGE UP
BUN SERPL-MCNC: 13 MG/DL — SIGNIFICANT CHANGE UP (ref 7–23)
CALCIUM SERPL-MCNC: 9.8 MG/DL — SIGNIFICANT CHANGE UP (ref 8.4–10.5)
CHLORIDE SERPL-SCNC: 103 MMOL/L — SIGNIFICANT CHANGE UP (ref 98–107)
CO2 SERPL-SCNC: 23 MMOL/L — SIGNIFICANT CHANGE UP (ref 22–31)
CREAT SERPL-MCNC: 0.84 MG/DL — SIGNIFICANT CHANGE UP (ref 0.5–1.3)
GLUCOSE SERPL-MCNC: 108 MG/DL — HIGH (ref 70–99)
HCG SERPL-ACNC: < 5 MIU/ML — SIGNIFICANT CHANGE UP
HCT VFR BLD CALC: 38.8 % — SIGNIFICANT CHANGE UP (ref 34.5–45)
HGB BLD-MCNC: 13 G/DL — SIGNIFICANT CHANGE UP (ref 11.5–15.5)
MCHC RBC-ENTMCNC: 29.3 PG — SIGNIFICANT CHANGE UP (ref 27–34)
MCHC RBC-ENTMCNC: 33.5 % — SIGNIFICANT CHANGE UP (ref 32–36)
MCV RBC AUTO: 87.6 FL — SIGNIFICANT CHANGE UP (ref 80–100)
NRBC # FLD: 0 K/UL — LOW (ref 25–125)
PLATELET # BLD AUTO: 284 K/UL — SIGNIFICANT CHANGE UP (ref 150–400)
PMV BLD: 10.3 FL — SIGNIFICANT CHANGE UP (ref 7–13)
POTASSIUM SERPL-MCNC: 4.1 MMOL/L — SIGNIFICANT CHANGE UP (ref 3.5–5.3)
POTASSIUM SERPL-SCNC: 4.1 MMOL/L — SIGNIFICANT CHANGE UP (ref 3.5–5.3)
RBC # BLD: 4.43 M/UL — SIGNIFICANT CHANGE UP (ref 3.8–5.2)
RBC # FLD: 11.6 % — SIGNIFICANT CHANGE UP (ref 10.3–14.5)
RH IG SCN BLD-IMP: POSITIVE — SIGNIFICANT CHANGE UP
SODIUM SERPL-SCNC: 139 MMOL/L — SIGNIFICANT CHANGE UP (ref 135–145)
WBC # BLD: 7.89 K/UL — SIGNIFICANT CHANGE UP (ref 3.8–10.5)
WBC # FLD AUTO: 7.89 K/UL — SIGNIFICANT CHANGE UP (ref 3.8–10.5)

## 2019-03-07 PROCEDURE — 93010 ELECTROCARDIOGRAM REPORT: CPT

## 2019-03-07 RX ORDER — SODIUM CHLORIDE 9 MG/ML
1000 INJECTION, SOLUTION INTRAVENOUS
Qty: 0 | Refills: 0 | Status: DISCONTINUED | OUTPATIENT
Start: 2019-03-20 | End: 2019-03-20

## 2019-03-07 NOTE — H&P PST ADULT - NEGATIVE GENERAL GENITOURINARY SYMPTOMS
no dysuria/normal urinary frequency/no hematuria/no flank pain L/no flank pain R/no bladder infections

## 2019-03-07 NOTE — H&P PST ADULT - GASTROINTESTINAL DETAILS
no rebound tenderness/no guarding/bowel sounds normal/no bruit/nontender/no rigidity/no organomegaly/soft/obese/no masses palpable

## 2019-03-07 NOTE — H&P PST ADULT - NEGATIVE BREAST SYMPTOMS
no breast lump R/no breast tenderness L/no breast tenderness R/no breast lump L/no nipple discharge L/no nipple discharge R

## 2019-03-07 NOTE — H&P PST ADULT - PROBLEM SELECTOR PLAN 1
Pt scheduled for upper endoscopy, robotic assisted laparoscopic hiatal hernia repair on 3/20/2019.  labs done results pending, ekg done.  Hibiclens provided.  Preop teaching done, pt able to verbalize understanding    Limited rom of neck, difficulty with extension    Pt is allergic to propofol

## 2019-03-07 NOTE — H&P PST ADULT - NSANTHOSAYNRD_GEN_A_CORE
No. DANISH screening performed.  STOP BANG Legend: 0-2 = LOW Risk; 3-4 = INTERMEDIATE Risk; 5-8 = HIGH Risk

## 2019-03-07 NOTE — H&P PST ADULT - ACTIVITY
sob with climbing a flight of stairs for the past 5 yrs, was evaluated by cardiologist, denies worsening of symptoms

## 2019-03-07 NOTE — H&P PST ADULT - HISTORY OF PRESENT ILLNESS
45y/o female scheduled for upper endoscopy, robotic assisted laparoscopic hiatal hernia repair on 3/20/2019.  Pt states, "hx gerd went for evaluation, endoscopy showed hiatal hernia."

## 2019-03-07 NOTE — H&P PST ADULT - PSH
History of cervical discectomy  cervical discectomy and fusion C4- C7 2012  History of ERCP  2005  S/P  Section  ,2004  S/P cholecystectomy  2005  S/P knee surgery  arthroscopy X2- left knee History of cervical discectomy  cervical discectomy and fusion C4- C7 2012  History of ERCP  2005  S/P  Section  ,2004  S/P cholecystectomy  2005  S/P knee surgery  arthroscopy X2- left knee  S/P lumpectomy of breast  left 2016

## 2019-03-07 NOTE — H&P PST ADULT - RS GEN PE MLT RESP DETAILS PC
airway patent/good air movement/clear to auscultation bilaterally clear to auscultation bilaterally/breath sounds equal/no chest wall tenderness/respirations non-labored/no intercostal retractions/no rales/good air movement/no rhonchi/no subcutaneous emphysema

## 2019-03-07 NOTE — H&P PST ADULT - CARDIOVASCULAR COMMENTS
hx of palpitations for the past 5 yrs, last episode couple of days ago lasting few seconds, not associated with activity, sob on exertion for the past 5 yrs was evaluated by cardiologist denies worsening of symptoms

## 2019-03-20 ENCOUNTER — INPATIENT (INPATIENT)
Facility: HOSPITAL | Age: 44
LOS: 1 days | Discharge: HOME | End: 2019-03-22
Attending: THORACIC SURGERY (CARDIOTHORACIC VASCULAR SURGERY) | Admitting: THORACIC SURGERY (CARDIOTHORACIC VASCULAR SURGERY)
Payer: MEDICARE

## 2019-03-20 ENCOUNTER — APPOINTMENT (OUTPATIENT)
Dept: THORACIC SURGERY | Facility: HOSPITAL | Age: 44
End: 2019-03-20

## 2019-03-20 ENCOUNTER — RESULT REVIEW (OUTPATIENT)
Age: 44
End: 2019-03-20

## 2019-03-20 VITALS
WEIGHT: 182.1 LBS | TEMPERATURE: 98 F | HEART RATE: 97 BPM | SYSTOLIC BLOOD PRESSURE: 138 MMHG | DIASTOLIC BLOOD PRESSURE: 83 MMHG | HEIGHT: 62 IN | OXYGEN SATURATION: 98 % | RESPIRATION RATE: 16 BRPM

## 2019-03-20 DIAGNOSIS — Z98.890 OTHER SPECIFIED POSTPROCEDURAL STATES: Chronic | ICD-10-CM

## 2019-03-20 DIAGNOSIS — K44.9 DIAPHRAGMATIC HERNIA WITHOUT OBSTRUCTION OR GANGRENE: ICD-10-CM

## 2019-03-20 LAB
HCG UR QL: NEGATIVE — SIGNIFICANT CHANGE UP
RH IG SCN BLD-IMP: POSITIVE — SIGNIFICANT CHANGE UP

## 2019-03-20 PROCEDURE — 99233 SBSQ HOSP IP/OBS HIGH 50: CPT

## 2019-03-20 PROCEDURE — 71045 X-RAY EXAM CHEST 1 VIEW: CPT | Mod: 26

## 2019-03-20 PROCEDURE — 88304 TISSUE EXAM BY PATHOLOGIST: CPT | Mod: 26

## 2019-03-20 RX ORDER — ACETAMINOPHEN 500 MG
1000 TABLET ORAL ONCE
Qty: 0 | Refills: 0 | Status: COMPLETED | OUTPATIENT
Start: 2019-03-20 | End: 2019-03-21

## 2019-03-20 RX ORDER — HEPARIN SODIUM 5000 [USP'U]/ML
5000 INJECTION INTRAVENOUS; SUBCUTANEOUS ONCE
Qty: 0 | Refills: 0 | Status: COMPLETED | OUTPATIENT
Start: 2019-03-20 | End: 2019-03-20

## 2019-03-20 RX ORDER — ACETAMINOPHEN 500 MG
1000 TABLET ORAL ONCE
Qty: 0 | Refills: 0 | Status: COMPLETED | OUTPATIENT
Start: 2019-03-20 | End: 2019-03-20

## 2019-03-20 RX ORDER — HYDROMORPHONE HYDROCHLORIDE 2 MG/ML
0.5 INJECTION INTRAMUSCULAR; INTRAVENOUS; SUBCUTANEOUS
Qty: 0 | Refills: 0 | Status: DISCONTINUED | OUTPATIENT
Start: 2019-03-20 | End: 2019-03-21

## 2019-03-20 RX ORDER — KETOROLAC TROMETHAMINE 30 MG/ML
15 SYRINGE (ML) INJECTION ONCE
Qty: 0 | Refills: 0 | Status: DISCONTINUED | OUTPATIENT
Start: 2019-03-20 | End: 2019-03-21

## 2019-03-20 RX ORDER — HEPARIN SODIUM 5000 [USP'U]/ML
5000 INJECTION INTRAVENOUS; SUBCUTANEOUS EVERY 8 HOURS
Qty: 0 | Refills: 0 | Status: DISCONTINUED | OUTPATIENT
Start: 2019-03-20 | End: 2019-03-22

## 2019-03-20 RX ORDER — HYDROMORPHONE HYDROCHLORIDE 2 MG/ML
30 INJECTION INTRAMUSCULAR; INTRAVENOUS; SUBCUTANEOUS
Qty: 0 | Refills: 0 | Status: DISCONTINUED | OUTPATIENT
Start: 2019-03-20 | End: 2019-03-21

## 2019-03-20 RX ORDER — SODIUM CHLORIDE 9 MG/ML
1000 INJECTION, SOLUTION INTRAVENOUS
Qty: 0 | Refills: 0 | Status: DISCONTINUED | OUTPATIENT
Start: 2019-03-20 | End: 2019-03-21

## 2019-03-20 RX ORDER — ONDANSETRON 8 MG/1
4 TABLET, FILM COATED ORAL EVERY 6 HOURS
Qty: 0 | Refills: 0 | Status: DISCONTINUED | OUTPATIENT
Start: 2019-03-20 | End: 2019-03-21

## 2019-03-20 RX ORDER — METOPROLOL TARTRATE 50 MG
5 TABLET ORAL EVERY 6 HOURS
Qty: 0 | Refills: 0 | Status: DISCONTINUED | OUTPATIENT
Start: 2019-03-20 | End: 2019-03-21

## 2019-03-20 RX ORDER — PANTOPRAZOLE SODIUM 20 MG/1
40 TABLET, DELAYED RELEASE ORAL DAILY
Qty: 0 | Refills: 0 | Status: DISCONTINUED | OUTPATIENT
Start: 2019-03-20 | End: 2019-03-22

## 2019-03-20 RX ORDER — NALOXONE HYDROCHLORIDE 4 MG/.1ML
0.1 SPRAY NASAL
Qty: 0 | Refills: 0 | Status: DISCONTINUED | OUTPATIENT
Start: 2019-03-20 | End: 2019-03-21

## 2019-03-20 RX ORDER — ONDANSETRON 8 MG/1
4 TABLET, FILM COATED ORAL ONCE
Qty: 0 | Refills: 0 | Status: DISCONTINUED | OUTPATIENT
Start: 2019-03-20 | End: 2019-03-22

## 2019-03-20 RX ORDER — DIPHENHYDRAMINE HCL 50 MG
25 CAPSULE ORAL EVERY 8 HOURS
Qty: 0 | Refills: 0 | Status: DISCONTINUED | OUTPATIENT
Start: 2019-03-20 | End: 2019-03-22

## 2019-03-20 RX ORDER — KETOROLAC TROMETHAMINE 30 MG/ML
15 SYRINGE (ML) INJECTION ONCE
Qty: 0 | Refills: 0 | Status: DISCONTINUED | OUTPATIENT
Start: 2019-03-20 | End: 2019-03-20

## 2019-03-20 RX ORDER — LIDOCAINE 4 G/100G
2 CREAM TOPICAL ONCE
Qty: 0 | Refills: 0 | Status: COMPLETED | OUTPATIENT
Start: 2019-03-20 | End: 2019-03-20

## 2019-03-20 RX ADMIN — HEPARIN SODIUM 5000 UNIT(S): 5000 INJECTION INTRAVENOUS; SUBCUTANEOUS at 13:38

## 2019-03-20 RX ADMIN — HYDROMORPHONE HYDROCHLORIDE 0.5 MILLIGRAM(S): 2 INJECTION INTRAMUSCULAR; INTRAVENOUS; SUBCUTANEOUS at 18:04

## 2019-03-20 RX ADMIN — PANTOPRAZOLE SODIUM 40 MILLIGRAM(S): 20 TABLET, DELAYED RELEASE ORAL at 13:37

## 2019-03-20 RX ADMIN — Medication 400 MILLIGRAM(S): at 17:00

## 2019-03-20 RX ADMIN — Medication 15 MILLIGRAM(S): at 16:20

## 2019-03-20 RX ADMIN — Medication 15 MILLIGRAM(S): at 16:07

## 2019-03-20 RX ADMIN — Medication 1000 MILLIGRAM(S): at 17:30

## 2019-03-20 RX ADMIN — SODIUM CHLORIDE 75 MILLILITER(S): 9 INJECTION, SOLUTION INTRAVENOUS at 11:14

## 2019-03-20 RX ADMIN — Medication 5 MILLIGRAM(S): at 13:35

## 2019-03-20 RX ADMIN — HYDROMORPHONE HYDROCHLORIDE 0.5 MILLIGRAM(S): 2 INJECTION INTRAMUSCULAR; INTRAVENOUS; SUBCUTANEOUS at 11:11

## 2019-03-20 RX ADMIN — HYDROMORPHONE HYDROCHLORIDE 30 MILLILITER(S): 2 INJECTION INTRAMUSCULAR; INTRAVENOUS; SUBCUTANEOUS at 19:13

## 2019-03-20 RX ADMIN — Medication 5 MILLIGRAM(S): at 18:58

## 2019-03-20 RX ADMIN — HEPARIN SODIUM 5000 UNIT(S): 5000 INJECTION INTRAVENOUS; SUBCUTANEOUS at 22:13

## 2019-03-20 RX ADMIN — LIDOCAINE 2 PATCH: 4 CREAM TOPICAL at 21:49

## 2019-03-20 RX ADMIN — SODIUM CHLORIDE 75 MILLILITER(S): 9 INJECTION, SOLUTION INTRAVENOUS at 22:13

## 2019-03-20 RX ADMIN — SODIUM CHLORIDE 30 MILLILITER(S): 9 INJECTION, SOLUTION INTRAVENOUS at 07:16

## 2019-03-20 RX ADMIN — HEPARIN SODIUM 5000 UNIT(S): 5000 INJECTION INTRAVENOUS; SUBCUTANEOUS at 07:15

## 2019-03-20 RX ADMIN — LIDOCAINE 2 PATCH: 4 CREAM TOPICAL at 16:06

## 2019-03-20 RX ADMIN — HYDROMORPHONE HYDROCHLORIDE 0.5 MILLIGRAM(S): 2 INJECTION INTRAMUSCULAR; INTRAVENOUS; SUBCUTANEOUS at 11:25

## 2019-03-20 NOTE — BRIEF OPERATIVE NOTE - NSICDXBRIEFPROCEDURE_GEN_ALL_CORE_FT
PROCEDURES:  Robot-assisted Toupet fundoplication 20-Mar-2019 11:02:59  Chela Jiang  Robot-assisted repair of paraesophageal hernia using da Freda Si 20-Mar-2019 11:02:45  Chela Jiang

## 2019-03-20 NOTE — ASU PREOP CHECKLIST - LOOSE TEETH
Left message to call back for scheduling short visit for HTN  
Scheduled appointment on 03/26/19 at 4, patient wanted afternoon  
noted  
no

## 2019-03-20 NOTE — PROGRESS NOTE ADULT - SUBJECTIVE AND OBJECTIVE BOX
GUSTAVO BIRD  MRN-[id number]    Patient is a 44y old  Female who presents with a chief complaint of GERD.    HPI:  43 y/o female with a history of GERD, irritable bowel syndrome, lumbar disc disease and radiculopathy was having worsening GERD and is s/p upper endoscopy and robotic assisted laparoscopic hiatal hernia repair. Per history the patient reported that she was having symptoms of GERD and went for an evaluation. She had an endoscopy that showed hiatal hernia. She is now s/p robotic assisted laparoscopic hiatal hernia repair on 3/20/2019. She has no complaints.    PAST MEDICAL & SURGICAL HISTORY:  Radiculopathy  GERD (gastroesophageal reflux disease)  IBS (irritable bowel syndrome)  Diaphragmatic hernia  Lumbar disc disease: 3 bulging disc an d 2 herniated disc  Cervical disc displacement  Renal stone  S/P lumpectomy of breast: left 2016  History of cervical discectomy: cervical discectomy and fusion C4- C7   S/P knee surgery: arthroscopy X2- left knee  History of ERCP:   S/P  Section: ,  S/P cholecystectomy:     FAMILY HISTORY:  Unremarkable    Social History:  Denies smoking, illicit drug use or frequent alcohol consumption    Allergies  propofol (Urticaria; Rash;)  Zosyn (Hives)    MEDICATIONS  (STANDING):  heparin  Injectable 5000 Unit(s) SubCutaneous every 8 hours  lactated ringers. 1000 milliLiter(s) (75 mL/Hr) IV Continuous <Continuous>  metoprolol tartrate Injectable 5 milliGRAM(s) IV Push every 6 hours  pantoprazole  Injectable 40 milliGRAM(s) IV Push daily    MEDICATIONS  (PRN):  HYDROmorphone  Injectable 0.5 milliGRAM(s) IV Push every 3 hours PRN Severe Pain (7 - 10)  ondansetron Injectable 4 milliGRAM(s) IV Push once PRN Nausea and/or Vomiting    Review of Systems:  Constitutional:  Negative for weight change, fever, malaise  HEENT:  Negative for sinus pain, hoarseness, sore throat, dysphagia, vision changes  Cardiovascular:  Negative for chest pain, palpitations, dizziness  Respiratory:  Negative for cough, wheezing, dyspnea  Gastrointestinal:  Negative for nausea, vomiting, diarrhea, melena  Musculoskeletal:  Negative for pain, swelling, stiffness   Neuro:  Negative for weakness, numbness, headache  Psych:  Negative for anxiety, depression  Endocrine:  Negative for polyuria, polydipsia, temperature Intolerance    All other systems negative unless otherwise stated    ICU Vital Signs Last 24 Hrs  T(C): 37.1 (20 Mar 2019 10:45), Max: 37.1 (20 Mar 2019 10:45)  T(F): 98.8 (20 Mar 2019 10:45), Max: 98.8 (20 Mar 2019 10:45)  HR: 104 (20 Mar 2019 11:15) (97 - 111)  BP: 135/73 (20 Mar 2019 11:15) (132/71 - 138/83)  BP(mean): 86 (20 Mar 2019 11:15) (83 - 86)  ABP: --  ABP(mean): --  RR: 19 (20 Mar 2019 11:15) (16 - 19)  SpO2: 100% (20 Mar 2019 11:15) (98% - 100%)    Daily Height in cm: 157.48 (20 Mar 2019 06:38)    Daily   I&O's Summary    20 Mar 2019 07:01  -  20 Mar 2019 11:29  --------------------------------------------------------  IN: 30 mL / OUT: 60 mL / NET: -30 mL    Physical Exam:  Gen: Alert, no apparent distress  CV: Regular rate and rhythm, no murmurs, rubs or gallops  Pulm: Clear to auscultation bilaterally, no rales, rhonchi or wheezes  GI: Abd is soft, non-tender and non-distended with +BS  Ext: No clubbing, cyanosis or edema  Neuro: A+Ox3, follows commands and moves all extremities    Labs and Radiology: Pending    Assessment/Plan: 43 y/o female with a history of GERD, irritable bowel syndrome, lumbar disc disease and radiculopathy was having worsening GERD and is s/p upper endoscopy and robotic assisted laparoscopic hiatal hernia repair.     Neuro:   Pain control with PCA / Tylenol IV                                           Cardiovascular:    Stable hemodynamics  Not on any pressors  On beta blocker as outpatient, will start Lopressor 5 mg IV q6h  Continue hemodynamic monitoring    Respiratory:  Pt is comfortable on nasal cannula  Encourage incentive spirometry  Follow up CXR    GI:  NPO with swallow study in AM  Continue Protonix  Continue Zofran for nausea - PRN	          Renal:  Continue LR 75CC/hr        Monitor I/Os and pending electrolytes    Hematologic / Oncology:  No signs of active bleeding                                         HSQ for DVT ppl  Follow pending CBC    Infectious disease:  All surgical sites look clean  No signs of infection   Monitor for fever / leukocytosis.    Endocrine:  Continue Accuchecks with coverage    All clinical, lab, hemodynamic and radiographic data were reviewed and the plan was discussed with CTICU team.     Mitch Das MD

## 2019-03-20 NOTE — ASU PATIENT PROFILE, ADULT - PSH
History of cervical discectomy  cervical discectomy and fusion C4- C7 2012  History of ERCP  2005  S/P  Section  ,2004  S/P cholecystectomy  2005  S/P knee surgery  arthroscopy X2- left knee  S/P lumpectomy of breast  left 2016

## 2019-03-20 NOTE — ASU PATIENT PROFILE, ADULT - PMH
Cervical disc displacement    Diaphragmatic hernia    GERD (gastroesophageal reflux disease)    IBS (irritable bowel syndrome)    Lumbar disc disease  3 bulging disc an d 2 herniated disc  Radiculopathy    Renal stone

## 2019-03-21 LAB
ANION GAP SERPL CALC-SCNC: 13 MMO/L — SIGNIFICANT CHANGE UP (ref 7–14)
BASOPHILS # BLD AUTO: 0.01 K/UL — SIGNIFICANT CHANGE UP (ref 0–0.2)
BASOPHILS NFR BLD AUTO: 0.1 % — SIGNIFICANT CHANGE UP (ref 0–2)
BUN SERPL-MCNC: 12 MG/DL — SIGNIFICANT CHANGE UP (ref 7–23)
CALCIUM SERPL-MCNC: 9.1 MG/DL — SIGNIFICANT CHANGE UP (ref 8.4–10.5)
CHLORIDE SERPL-SCNC: 99 MMOL/L — SIGNIFICANT CHANGE UP (ref 98–107)
CO2 SERPL-SCNC: 24 MMOL/L — SIGNIFICANT CHANGE UP (ref 22–31)
CREAT SERPL-MCNC: 0.72 MG/DL — SIGNIFICANT CHANGE UP (ref 0.5–1.3)
EOSINOPHIL # BLD AUTO: 0 K/UL — SIGNIFICANT CHANGE UP (ref 0–0.5)
EOSINOPHIL NFR BLD AUTO: 0 % — SIGNIFICANT CHANGE UP (ref 0–6)
GLUCOSE SERPL-MCNC: 100 MG/DL — HIGH (ref 70–99)
HCT VFR BLD CALC: 33.8 % — LOW (ref 34.5–45)
HGB BLD-MCNC: 11.3 G/DL — LOW (ref 11.5–15.5)
IMM GRANULOCYTES NFR BLD AUTO: 0.4 % — SIGNIFICANT CHANGE UP (ref 0–1.5)
LYMPHOCYTES # BLD AUTO: 1.26 K/UL — SIGNIFICANT CHANGE UP (ref 1–3.3)
LYMPHOCYTES # BLD AUTO: 13.5 % — SIGNIFICANT CHANGE UP (ref 13–44)
MCHC RBC-ENTMCNC: 29.4 PG — SIGNIFICANT CHANGE UP (ref 27–34)
MCHC RBC-ENTMCNC: 33.4 % — SIGNIFICANT CHANGE UP (ref 32–36)
MCV RBC AUTO: 87.8 FL — SIGNIFICANT CHANGE UP (ref 80–100)
MONOCYTES # BLD AUTO: 0.71 K/UL — SIGNIFICANT CHANGE UP (ref 0–0.9)
MONOCYTES NFR BLD AUTO: 7.6 % — SIGNIFICANT CHANGE UP (ref 2–14)
NEUTROPHILS # BLD AUTO: 7.33 K/UL — SIGNIFICANT CHANGE UP (ref 1.8–7.4)
NEUTROPHILS NFR BLD AUTO: 78.4 % — HIGH (ref 43–77)
NRBC # FLD: 0 K/UL — LOW (ref 25–125)
PLATELET # BLD AUTO: 229 K/UL — SIGNIFICANT CHANGE UP (ref 150–400)
PMV BLD: 10.3 FL — SIGNIFICANT CHANGE UP (ref 7–13)
POTASSIUM SERPL-MCNC: 4.3 MMOL/L — SIGNIFICANT CHANGE UP (ref 3.5–5.3)
POTASSIUM SERPL-SCNC: 4.3 MMOL/L — SIGNIFICANT CHANGE UP (ref 3.5–5.3)
RBC # BLD: 3.85 M/UL — SIGNIFICANT CHANGE UP (ref 3.8–5.2)
RBC # FLD: 11.6 % — SIGNIFICANT CHANGE UP (ref 10.3–14.5)
SODIUM SERPL-SCNC: 136 MMOL/L — SIGNIFICANT CHANGE UP (ref 135–145)
WBC # BLD: 9.35 K/UL — SIGNIFICANT CHANGE UP (ref 3.8–10.5)
WBC # FLD AUTO: 9.35 K/UL — SIGNIFICANT CHANGE UP (ref 3.8–10.5)

## 2019-03-21 PROCEDURE — 74220 X-RAY XM ESOPHAGUS 1CNTRST: CPT | Mod: 26

## 2019-03-21 PROCEDURE — 99233 SBSQ HOSP IP/OBS HIGH 50: CPT

## 2019-03-21 RX ORDER — CELECOXIB 200 MG/1
200 CAPSULE ORAL AT BEDTIME
Qty: 0 | Refills: 0 | Status: DISCONTINUED | OUTPATIENT
Start: 2019-03-21 | End: 2019-03-22

## 2019-03-21 RX ORDER — OXYCODONE HYDROCHLORIDE 5 MG/1
5 TABLET ORAL EVERY 4 HOURS
Qty: 0 | Refills: 0 | Status: DISCONTINUED | OUTPATIENT
Start: 2019-03-21 | End: 2019-03-21

## 2019-03-21 RX ORDER — HYDROMORPHONE HYDROCHLORIDE 2 MG/ML
4 INJECTION INTRAMUSCULAR; INTRAVENOUS; SUBCUTANEOUS EVERY 6 HOURS
Qty: 0 | Refills: 0 | Status: DISCONTINUED | OUTPATIENT
Start: 2019-03-21 | End: 2019-03-21

## 2019-03-21 RX ORDER — OXYCODONE HYDROCHLORIDE 5 MG/1
10 TABLET ORAL EVERY 4 HOURS
Qty: 0 | Refills: 0 | Status: DISCONTINUED | OUTPATIENT
Start: 2019-03-21 | End: 2019-03-21

## 2019-03-21 RX ORDER — CARISOPRODOL 250 MG
350 TABLET ORAL AT BEDTIME
Qty: 0 | Refills: 0 | Status: DISCONTINUED | OUTPATIENT
Start: 2019-03-21 | End: 2019-03-22

## 2019-03-21 RX ORDER — DOCUSATE SODIUM 100 MG
100 CAPSULE ORAL
Qty: 0 | Refills: 0 | Status: DISCONTINUED | OUTPATIENT
Start: 2019-03-21 | End: 2019-03-22

## 2019-03-21 RX ORDER — SIMETHICONE 80 MG/1
80 TABLET, CHEWABLE ORAL EVERY 8 HOURS
Qty: 0 | Refills: 0 | Status: DISCONTINUED | OUTPATIENT
Start: 2019-03-21 | End: 2019-03-22

## 2019-03-21 RX ORDER — NORTRIPTYLINE HYDROCHLORIDE 10 MG/1
25 CAPSULE ORAL AT BEDTIME
Qty: 0 | Refills: 0 | Status: DISCONTINUED | OUTPATIENT
Start: 2019-03-21 | End: 2019-03-22

## 2019-03-21 RX ORDER — POLYETHYLENE GLYCOL 3350 17 G/17G
17 POWDER, FOR SOLUTION ORAL DAILY
Qty: 0 | Refills: 0 | Status: DISCONTINUED | OUTPATIENT
Start: 2019-03-21 | End: 2019-03-22

## 2019-03-21 RX ORDER — HYDROMORPHONE HYDROCHLORIDE 2 MG/ML
2 INJECTION INTRAMUSCULAR; INTRAVENOUS; SUBCUTANEOUS EVERY 6 HOURS
Qty: 0 | Refills: 0 | Status: DISCONTINUED | OUTPATIENT
Start: 2019-03-21 | End: 2019-03-22

## 2019-03-21 RX ORDER — METOPROLOL TARTRATE 50 MG
25 TABLET ORAL
Qty: 0 | Refills: 0 | Status: DISCONTINUED | OUTPATIENT
Start: 2019-03-21 | End: 2019-03-22

## 2019-03-21 RX ORDER — ACETAMINOPHEN 500 MG
1000 TABLET ORAL ONCE
Qty: 0 | Refills: 0 | Status: DISCONTINUED | OUTPATIENT
Start: 2019-03-21 | End: 2019-03-22

## 2019-03-21 RX ORDER — HYDROMORPHONE HYDROCHLORIDE 2 MG/ML
4 INJECTION INTRAMUSCULAR; INTRAVENOUS; SUBCUTANEOUS EVERY 4 HOURS
Qty: 0 | Refills: 0 | Status: DISCONTINUED | OUTPATIENT
Start: 2019-03-21 | End: 2019-03-22

## 2019-03-21 RX ORDER — ACETAMINOPHEN 500 MG
650 TABLET ORAL EVERY 6 HOURS
Qty: 0 | Refills: 0 | Status: DISCONTINUED | OUTPATIENT
Start: 2019-03-21 | End: 2019-03-22

## 2019-03-21 RX ADMIN — HEPARIN SODIUM 5000 UNIT(S): 5000 INJECTION INTRAVENOUS; SUBCUTANEOUS at 22:00

## 2019-03-21 RX ADMIN — Medication 15 MILLIGRAM(S): at 00:00

## 2019-03-21 RX ADMIN — Medication 25 MILLIGRAM(S): at 15:09

## 2019-03-21 RX ADMIN — HYDROMORPHONE HYDROCHLORIDE 4 MILLIGRAM(S): 2 INJECTION INTRAMUSCULAR; INTRAVENOUS; SUBCUTANEOUS at 21:58

## 2019-03-21 RX ADMIN — CELECOXIB 200 MILLIGRAM(S): 200 CAPSULE ORAL at 22:40

## 2019-03-21 RX ADMIN — LIDOCAINE 2 PATCH: 4 CREAM TOPICAL at 04:00

## 2019-03-21 RX ADMIN — PANTOPRAZOLE SODIUM 40 MILLIGRAM(S): 20 TABLET, DELAYED RELEASE ORAL at 11:20

## 2019-03-21 RX ADMIN — Medication 100 MILLIGRAM(S): at 17:31

## 2019-03-21 RX ADMIN — HYDROMORPHONE HYDROCHLORIDE 4 MILLIGRAM(S): 2 INJECTION INTRAMUSCULAR; INTRAVENOUS; SUBCUTANEOUS at 17:22

## 2019-03-21 RX ADMIN — Medication 15 MILLIGRAM(S): at 00:30

## 2019-03-21 RX ADMIN — HYDROMORPHONE HYDROCHLORIDE 4 MILLIGRAM(S): 2 INJECTION INTRAMUSCULAR; INTRAVENOUS; SUBCUTANEOUS at 20:58

## 2019-03-21 RX ADMIN — Medication 350 MILLIGRAM(S): at 23:52

## 2019-03-21 RX ADMIN — Medication 400 MILLIGRAM(S): at 06:43

## 2019-03-21 RX ADMIN — POLYETHYLENE GLYCOL 3350 17 GRAM(S): 17 POWDER, FOR SOLUTION ORAL at 17:31

## 2019-03-21 RX ADMIN — Medication 25 MILLIGRAM(S): at 17:31

## 2019-03-21 RX ADMIN — NORTRIPTYLINE HYDROCHLORIDE 25 MILLIGRAM(S): 10 CAPSULE ORAL at 22:01

## 2019-03-21 RX ADMIN — HEPARIN SODIUM 5000 UNIT(S): 5000 INJECTION INTRAVENOUS; SUBCUTANEOUS at 13:57

## 2019-03-21 RX ADMIN — Medication 1000 MILLIGRAM(S): at 07:30

## 2019-03-21 RX ADMIN — HYDROMORPHONE HYDROCHLORIDE 4 MILLIGRAM(S): 2 INJECTION INTRAMUSCULAR; INTRAVENOUS; SUBCUTANEOUS at 16:45

## 2019-03-21 RX ADMIN — CELECOXIB 200 MILLIGRAM(S): 200 CAPSULE ORAL at 22:00

## 2019-03-21 RX ADMIN — Medication 5 MILLIGRAM(S): at 00:00

## 2019-03-21 RX ADMIN — HYDROMORPHONE HYDROCHLORIDE 30 MILLILITER(S): 2 INJECTION INTRAMUSCULAR; INTRAVENOUS; SUBCUTANEOUS at 12:26

## 2019-03-21 RX ADMIN — HEPARIN SODIUM 5000 UNIT(S): 5000 INJECTION INTRAVENOUS; SUBCUTANEOUS at 06:43

## 2019-03-21 RX ADMIN — SIMETHICONE 80 MILLIGRAM(S): 80 TABLET, CHEWABLE ORAL at 22:00

## 2019-03-21 RX ADMIN — Medication 5 MILLIGRAM(S): at 06:43

## 2019-03-21 RX ADMIN — Medication 5 MILLIGRAM(S): at 11:20

## 2019-03-21 NOTE — PROGRESS NOTE ADULT - SUBJECTIVE AND OBJECTIVE BOX
GUSTAVO BIRD  MRN-[id number]    Patient is a 44y old  Female who presents with a chief complaint of GERD.    HPI:  43 y/o female with a history of GERD, irritable bowel syndrome, lumbar disc disease and radiculopathy was having worsening GERD and is s/p upper endoscopy and robotic assisted laparoscopic hiatal hernia repair. Per history the patient reported that she was having symptoms of GERD and went for an evaluation. She had an endoscopy that showed hiatal hernia. She is now s/p robotic assisted laparoscopic hiatal hernia repair on 3/20/2019. She has no complaints.    PAST MEDICAL & SURGICAL HISTORY:  Radiculopathy  GERD (gastroesophageal reflux disease)  IBS (irritable bowel syndrome)  Diaphragmatic hernia  Lumbar disc disease: 3 bulging disc an d 2 herniated disc  Cervical disc displacement  Renal stone  S/P lumpectomy of breast: left 2016  History of cervical discectomy: cervical discectomy and fusion C4- C7   S/P knee surgery: arthroscopy X2- left knee  History of ERCP:   S/P  Section: ,  S/P cholecystectomy:     FAMILY HISTORY:  Unremarkable    Social History:  Denies smoking, illicit drug use or frequent alcohol consumption    Allergies  propofol (Urticaria; Rash;)  Zosyn (Hives)    MEDICATIONS  (STANDING):  heparin  Injectable 5000 Unit(s) SubCutaneous every 8 hours  HYDROmorphone PCA (1 mG/mL) 30 milliLiter(s) PCA Continuous PCA Continuous  lactated ringers. 1000 milliLiter(s) (75 mL/Hr) IV Continuous <Continuous>  metoprolol tartrate Injectable 5 milliGRAM(s) IV Push every 6 hours  pantoprazole  Injectable 40 milliGRAM(s) IV Push daily    MEDICATIONS  (PRN):  diphenhydrAMINE   Injectable 25 milliGRAM(s) IV Push every 8 hours PRN Itching  HYDROmorphone  Injectable 0.5 milliGRAM(s) IV Push every 3 hours PRN Severe Pain (7 - 10)  HYDROmorphone  Injectable 0.5 milliGRAM(s) IV Push every 10 minutes PRN Moderate Pain (4 - 6)  HYDROmorphone PCA (1 mG/mL) Rescue Clinician Bolus 0.5 milliGRAM(s) IV Push every 15 minutes PRN for Pain Scale GREATER THAN 6  naloxone Injectable 0.1 milliGRAM(s) IV Push every 3 minutes PRN For ANY of the following changes in patient status:  A. RR LESS THAN 10 breaths per minute, B. Oxygen saturation LESS THAN 90%, C. Sedation score of 6  ondansetron Injectable 4 milliGRAM(s) IV Push every 6 hours PRN Nausea  ondansetron Injectable 4 milliGRAM(s) IV Push once PRN Nausea and/or Vomiting    Review of Systems:  Constitutional:  Negative for weight change, fever, malaise  HEENT:  Negative for sinus pain, hoarseness, sore throat, dysphagia, vision changes  Cardiovascular:  Negative for chest pain, palpitations, dizziness  Respiratory:  Negative for cough, wheezing, dyspnea  Gastrointestinal:  Negative for nausea, vomiting, diarrhea, melena  Musculoskeletal:  Negative for pain, swelling, stiffness   Neuro:  Negative for weakness, numbness, headache  Psych:  Negative for anxiety, depression  Endocrine:  Negative for polyuria, polydipsia, temperature Intolerance    All other systems negative unless otherwise stated    ICU Vital Signs Last 24 Hrs  T(C): 36.7 (21 Mar 2019 00:00), Max: 37.1 (20 Mar 2019 10:45)  T(F): 98.1 (21 Mar 2019 00:00), Max: 98.8 (20 Mar 2019 10:45)  HR: 96 (21 Mar 2019 06:00) (77 - 111)  BP: 109/55 (21 Mar 2019 06:00) (96/44 - 135/82)  BP(mean): 66 (21 Mar 2019 06:00) (56 - 93)  ABP: --  ABP(mean): --  RR: 25 (21 Mar 2019 06:00) (12 - 25)  SpO2: 98% (21 Mar 2019 06:00) (94% - 100%)    Daily     Daily   I&O's Summary    20 Mar 2019 07:01  -  21 Mar 2019 06:54  --------------------------------------------------------  IN: 1575 mL / OUT: 1800 mL / NET: -225 mL    Physical Exam:  Gen: Alert, no apparent distress  CV: Regular rate and rhythm, no murmurs, rubs or gallops  Pulm: Clear to auscultation bilaterally, no rales, rhonchi or wheezes  GI: Abd is soft, non-tender and non-distended with +BS  Ext: No clubbing, cyanosis or edema  Neuro: A+Ox3, follows commands and moves all extremities    Labs:                          11.3   9.35  )-----------( 229      ( 21 Mar 2019 06:00 )             33.8     Radiology:  EXAM:  XR CHEST PORTABLE URGENT 1V    PROCEDURE DATE:  Mar 20 2019   INTERPRETATION:   The lungs are clear. The heart is not enlarged and there is no effusion   or pneumothorax. Cervical spine orthopedic hardware in place.  Tiny   postop pneumomediastinum suggested.  IMPRESSION:  Clear lungs post hernia repair.    Assessment/Plan: 43 y/o female with a history of GERD, irritable bowel syndrome, lumbar disc disease and radiculopathy was having worsening GERD and is s/p upper endoscopy and robotic assisted laparoscopic hiatal hernia repair.     Neuro:   Pain control with PCA / Tylenol IV / Toradol IV                                          Cardiovascular:    Stable hemodynamics  Not on any pressors  On beta blocker as outpatient, on Lopressor 5 mg IV q6h while NPO  Continue hemodynamic monitoring    Respiratory:  Pt is comfortable on nasal cannula  Encourage incentive spirometry  Follow up CXR    GI:  NPO with swallow study today  Continue Protonix  Continue Zofran for nausea - PRN	          Renal:  Continue LR 75CC/hr        Monitor I/Os and pending electrolytes    Hematologic / Oncology:  No signs of active bleeding                                         HSQ for DVT ppl  Follow pending CBC    Infectious disease:  All surgical sites look clean  No signs of infection   Monitor for fever / leukocytosis.    Endocrine:  Continue Accuchecks with coverage    All clinical, lab, hemodynamic and radiographic data were reviewed and the plan was discussed with CTICU team.     Mitch Das MD

## 2019-03-21 NOTE — PROGRESS NOTE ADULT - SUBJECTIVE AND OBJECTIVE BOX
Anesthesia Pain Management Service    SUBJECTIVE: Pt doing well with IV PCA without problems reported.    Therapy:	  [ X] IV PCA	   [ ] Epidural           [ ] s/p Spinal Opoid              [ ] Postpartum infusion	  [ ] Patient controlled regional anesthesia (PCRA)    [ ] prn Analgesics    Allergies    propofol (Urticaria; Rash;)  Zosyn (Hives)    Intolerances      MEDICATIONS  (STANDING):  acetaminophen  IVPB .. 1000 milliGRAM(s) IV Intermittent once  heparin  Injectable 5000 Unit(s) SubCutaneous every 8 hours  HYDROmorphone PCA (1 mG/mL) 30 milliLiter(s) PCA Continuous PCA Continuous  lactated ringers. 1000 milliLiter(s) (75 mL/Hr) IV Continuous <Continuous>  metoprolol tartrate Injectable 5 milliGRAM(s) IV Push every 6 hours  pantoprazole  Injectable 40 milliGRAM(s) IV Push daily    MEDICATIONS  (PRN):  diphenhydrAMINE   Injectable 25 milliGRAM(s) IV Push every 8 hours PRN Itching  HYDROmorphone PCA (1 mG/mL) Rescue Clinician Bolus 0.5 milliGRAM(s) IV Push every 15 minutes PRN for Pain Scale GREATER THAN 6  naloxone Injectable 0.1 milliGRAM(s) IV Push every 3 minutes PRN For ANY of the following changes in patient status:  A. RR LESS THAN 10 breaths per minute, B. Oxygen saturation LESS THAN 90%, C. Sedation score of 6  ondansetron Injectable 4 milliGRAM(s) IV Push every 6 hours PRN Nausea  ondansetron Injectable 4 milliGRAM(s) IV Push once PRN Nausea and/or Vomiting      OBJECTIVE:   [X] No new signs     [ ] Other:    Side Effects:  [X ] None			[ ] Other:    Assessment of Catheter Site:		[ ] Intact		[ ] Other:    ASSESSMENT/PLAN  [ X] Continue current therapy    [ ] Therapy changed to:    [ ] IV PCA       [ ] Epidural     [ ] prn Analgesics     Comments:

## 2019-03-21 NOTE — PROGRESS NOTE ADULT - SUBJECTIVE AND OBJECTIVE BOX
ANESTHESIA POSTOP CHECK    44y Female POSTOP DAY 1    Vital Signs Last 24 Hrs  T(C): 36.7 (21 Mar 2019 08:00), Max: 37.1 (20 Mar 2019 10:45)  T(F): 98 (21 Mar 2019 08:00), Max: 98.8 (20 Mar 2019 10:45)  HR: 81 (21 Mar 2019 09:00) (77 - 111)  BP: 113/70 (21 Mar 2019 09:00) (96/44 - 135/82)  BP(mean): 80 (21 Mar 2019 09:00) (56 - 93)  RR: 21 (21 Mar 2019 09:00) (12 - 25)  SpO2: 99% (21 Mar 2019 09:00) (94% - 100%)  I&O's Summary    20 Mar 2019 07:01  -  21 Mar 2019 07:00  --------------------------------------------------------  IN: 1575 mL / OUT: 1840 mL / NET: -265 mL    21 Mar 2019 07:01  -  21 Mar 2019 10:43  --------------------------------------------------------  IN: 300 mL / OUT: 140 mL / NET: 160 mL        [X] NO NAUSEA  [X] NO APPARENT ANESTHESIA COMPLICATIONS

## 2019-03-21 NOTE — PROGRESS NOTE ADULT - SUBJECTIVE AND OBJECTIVE BOX
POST ANESTHESIA EVALUATION    44y Female POSTOP DAY 1 S/P     MENTAL STATUS: Patient participation [ x ] Awake     [  ] Arousable     [  ] Sedated    AIRWAY PATENCY: [x  ] Satisfactory  [  ] Other:     Vital Signs Last 24 Hrs  T(C): 36.7 (21 Mar 2019 08:00), Max: 37.1 (20 Mar 2019 16:00)  T(F): 98 (21 Mar 2019 08:00), Max: 98.8 (20 Mar 2019 16:00)  HR: 81 (21 Mar 2019 09:00) (77 - 108)  BP: 113/70 (21 Mar 2019 09:00) (96/44 - 135/82)  BP(mean): 80 (21 Mar 2019 09:00) (56 - 93)  RR: 21 (21 Mar 2019 09:00) (12 - 25)  SpO2: 99% (21 Mar 2019 09:00) (94% - 100%)  I&O's Summary    20 Mar 2019 07:01  -  21 Mar 2019 07:00  --------------------------------------------------------  IN: 1575 mL / OUT: 1840 mL / NET: -265 mL    21 Mar 2019 07:01  -  21 Mar 2019 11:10  --------------------------------------------------------  IN: 300 mL / OUT: 140 mL / NET: 160 mL          NAUSEA/ VOMITTING:  [ x ] NONE  [  ] CONTROLLED [  ] OTHER     PAIN: [x] CONTROLLED WITH CURRENT REGIMEN  [  ] OTHER    [ x ] NO APPARENT ANESTHESIA COMPLICATIONS      Comments:

## 2019-03-21 NOTE — PROGRESS NOTE ADULT - SUBJECTIVE AND OBJECTIVE BOX
Anesthesia Pain Management Service    SUBJECTIVE: Patient is doing well with IV PCA and no significant problems reported. Patient stats pain is 7/10, 8/10 with deep breathing, some relief with IV PCA.     Pain Scale Score	At rest: _7/10	With Activity: 8/10 	[X ] Refer to charted pain scores    THERAPY:    [ ] IV PCA Morphine		[ ] 5 mg/mL	[ ] 1 mg/mL  [X ] IV PCA Hydromorphone	[ ] 5 mg/mL	[X ] 1 mg/mL  [ ] IV PCA Fentanyl		[ ] 50 micrograms/mL    Demand dose __0.2_ lockout __6_ (minutes) Continuous Rate _0__ Total: 4.5  mg used (in past 24 hours)      MEDICATIONS  (STANDING):  heparin  Injectable 5000 Unit(s) SubCutaneous every 8 hours  HYDROmorphone PCA (1 mG/mL) 30 milliLiter(s) PCA Continuous PCA Continuous  lactated ringers. 1000 milliLiter(s) (75 mL/Hr) IV Continuous <Continuous>  metoprolol tartrate Injectable 5 milliGRAM(s) IV Push every 6 hours  pantoprazole  Injectable 40 milliGRAM(s) IV Push daily    MEDICATIONS  (PRN):  diphenhydrAMINE   Injectable 25 milliGRAM(s) IV Push every 8 hours PRN Itching  HYDROmorphone PCA (1 mG/mL) Rescue Clinician Bolus 0.5 milliGRAM(s) IV Push every 15 minutes PRN for Pain Scale GREATER THAN 6  naloxone Injectable 0.1 milliGRAM(s) IV Push every 3 minutes PRN For ANY of the following changes in patient status:  A. RR LESS THAN 10 breaths per minute, B. Oxygen saturation LESS THAN 90%, C. Sedation score of 6  ondansetron Injectable 4 milliGRAM(s) IV Push every 6 hours PRN Nausea  ondansetron Injectable 4 milliGRAM(s) IV Push once PRN Nausea and/or Vomiting      OBJECTIVE:    Sedation Score:	[ X] Alert	[ ] Drowsy 	[ ] Arousable	[ ] Asleep	[ ] Unresponsive    Side Effects:	[X ] None	[ ] Nausea	[ ] Vomiting	[ ] Pruritus  		[ ] Other:    Vital Signs Last 24 Hrs  T(C): 36.7 (21 Mar 2019 08:00), Max: 37.1 (20 Mar 2019 10:45)  T(F): 98 (21 Mar 2019 08:00), Max: 98.8 (20 Mar 2019 10:45)  HR: 81 (21 Mar 2019 09:00) (77 - 111)  BP: 113/70 (21 Mar 2019 09:00) (96/44 - 135/82)  BP(mean): 80 (21 Mar 2019 09:00) (56 - 93)  RR: 21 (21 Mar 2019 09:00) (12 - 25)  SpO2: 99% (21 Mar 2019 09:00) (94% - 100%)    ASSESSMENT/ PLAN    Therapy to  be:	[ X] Continue   [ ] Discontinued   [ ] Change to prn Analgesics    Documentation and Verification of current medications:   [X] Done	[ ] Not done, not elligible    Comments: Patient takes Dilaudid 4 mg PO Q6H PRN intermittently for chronic low back pain and neck pain, as well as Hydrocodone and muscle relaxant and celebrex (held pre-op). Will i-stop and place in chart. Likely not completely opioid tolerant given intermittent and non-frequent use.  Pain moderately controlled currently with IV PCA, will increase demand dose to 0.25 mg. Remains NPO, will transition to PO medications once taking diet. C/w standing Tylenol.

## 2019-03-22 ENCOUNTER — TRANSCRIPTION ENCOUNTER (OUTPATIENT)
Age: 44
End: 2019-03-22

## 2019-03-22 VITALS
TEMPERATURE: 99 F | DIASTOLIC BLOOD PRESSURE: 69 MMHG | SYSTOLIC BLOOD PRESSURE: 120 MMHG | RESPIRATION RATE: 18 BRPM | OXYGEN SATURATION: 96 % | HEART RATE: 80 BPM

## 2019-03-22 PROCEDURE — 99238 HOSP IP/OBS DSCHRG MGMT 30/<: CPT

## 2019-03-22 RX ORDER — HYDROMORPHONE HYDROCHLORIDE 2 MG/ML
1 INJECTION INTRAMUSCULAR; INTRAVENOUS; SUBCUTANEOUS
Qty: 8 | Refills: 0
Start: 2019-03-22 | End: 2019-03-23

## 2019-03-22 RX ORDER — DOCUSATE SODIUM 100 MG
1 CAPSULE ORAL
Qty: 0 | Refills: 0 | DISCHARGE
Start: 2019-03-22

## 2019-03-22 RX ORDER — ACETAMINOPHEN 500 MG
5 TABLET ORAL
Qty: 0 | Refills: 0 | DISCHARGE
Start: 2019-03-22

## 2019-03-22 RX ORDER — HYDROMORPHONE HYDROCHLORIDE 2 MG/ML
1 INJECTION INTRAMUSCULAR; INTRAVENOUS; SUBCUTANEOUS
Qty: 0 | Refills: 0 | COMMUNITY

## 2019-03-22 RX ORDER — HYDROMORPHONE HYDROCHLORIDE 2 MG/ML
1 INJECTION INTRAMUSCULAR; INTRAVENOUS; SUBCUTANEOUS
Qty: 8 | Refills: 0 | OUTPATIENT
Start: 2019-03-22 | End: 2019-03-23

## 2019-03-22 RX ORDER — SIMETHICONE 80 MG/1
1 TABLET, CHEWABLE ORAL
Qty: 21 | Refills: 0 | OUTPATIENT
Start: 2019-03-22 | End: 2019-03-28

## 2019-03-22 RX ADMIN — SIMETHICONE 80 MILLIGRAM(S): 80 TABLET, CHEWABLE ORAL at 05:14

## 2019-03-22 RX ADMIN — Medication 25 MILLIGRAM(S): at 05:14

## 2019-03-22 RX ADMIN — Medication 100 MILLIGRAM(S): at 05:14

## 2019-03-22 RX ADMIN — HYDROMORPHONE HYDROCHLORIDE 4 MILLIGRAM(S): 2 INJECTION INTRAMUSCULAR; INTRAVENOUS; SUBCUTANEOUS at 09:00

## 2019-03-22 RX ADMIN — HEPARIN SODIUM 5000 UNIT(S): 5000 INJECTION INTRAVENOUS; SUBCUTANEOUS at 05:14

## 2019-03-22 RX ADMIN — HYDROMORPHONE HYDROCHLORIDE 4 MILLIGRAM(S): 2 INJECTION INTRAMUSCULAR; INTRAVENOUS; SUBCUTANEOUS at 08:04

## 2019-03-22 NOTE — DISCHARGE NOTE NURSING/CASE MANAGEMENT/SOCIAL WORK - NSDCPNINST_GEN_ALL_CORE
Keep surgical incisions clean and dry. For any signs of infection such as fever over 100.4F, new pain that cannot be controlled with ordered medication, unusual discharge, and or chills, please call your primary care provider or visit the emergency room.

## 2019-03-22 NOTE — DISCHARGE NOTE PROVIDER - HOSPITAL COURSE
43y/o female underwent upper endoscopy, robotic assisted laparoscopic hiatal hernia repair on 3/20/2019.  Post-op course uneventful.  Pt tolerated clears prior to discharge home. 45y/o female underwent upper endoscopy, robotic assisted laparoscopic hiatal hernia repair on 3/20/2019.  Post-op course uneventful.  Pt tolerated clears prior to discharge home on 3/22/19 and she will be going home with dicharge instructions to advacne diet.

## 2019-03-22 NOTE — DISCHARGE NOTE PROVIDER - INSTRUCTIONS
Clear liquid diet; No carbonated beverages Continue with a clear liquid diet for 1 more day.   Then you  may advance your diet to a soft pureed diet, as tolerated until you see Dr. Schaeffer in the office in a week. Continue with a clear liquid diet for 1 more day.   Then you  may advance your diet to a soft pureed diet, as tolerated until you see Dr. Schaeffer in the office in 10 days.  Have small meals throughout the day, as tolerated.   Avoid carbonated beverages.

## 2019-03-22 NOTE — DISCHARGE NOTE PROVIDER - CARE PROVIDER_API CALL
Madi Schaeffer)  Thoracic Surgery  [address] [place] [address]  Phone: [phone]  Fax: [phone]  Follow Up Time:     Phoenix Larsen ([place] [address]  Phone: [phone]  Fax: [phone]  Follow Up Time:

## 2019-03-22 NOTE — DISCHARGE NOTE NURSING/CASE MANAGEMENT/SOCIAL WORK - NSDCDPATPORTLINK_GEN_ALL_CORE
You can access the [facility] Patient Portal, offered by [facility], by registering with the following website: [email or web address]

## 2019-03-22 NOTE — DISCHARGE NOTE PROVIDER - NSDCCPCAREPLAN_GEN_ALL_CORE_FT
PRINCIPAL DISCHARGE DIAGNOSIS  Diagnosis: Hiatal hernia  Assessment and Plan of Treatment: PRINCIPAL DISCHARGE DIAGNOSIS  Diagnosis: Hiatal hernia  Assessment and Plan of Treatment: Follow instructions and return for your appointment as directed

## 2019-03-22 NOTE — DISCHARGE NOTE PROVIDER - NSDCCPTREATMENT_GEN_ALL_CORE_FT
PRINCIPAL PROCEDURE  Procedure: Robot-assisted Toupet fundoplication  Findings and Treatment: PRINCIPAL PROCEDURE  Procedure: Robot-assisted Toupet fundoplication  Findings and Treatment: post robotic hernia repair. Continue medications as prescribed

## 2019-03-22 NOTE — DISCHARGE NOTE NURSING/CASE MANAGEMENT/SOCIAL WORK - NSDPDISTO_GEN_ALL_CORE
Patient is AXOX4. Denied chest pain and shortness of breath. Vital signs remained stable. Pain was assessed and remained at an acceptable level with interventions. Incentive spirometer encouraged. Patient ambulated in hallway. Patient safety maintained through out shift. Surgical incision is d/c/i. IV's have been removed and patient is being discharged./Home

## 2019-03-22 NOTE — DISCHARGE NOTE PROVIDER - NSDCFUADDINST_GEN_ALL_CORE_FT
Keep wounds clean and dry.  For any nausea, vomiting, wound pus, redness, or fevers, call Dr Schaeffer. -  Continue with daily ambulation and use of incentive spirometer.   - Call the office if you experience any fevers, shortness of breath, chest pain or excessive or purulent drainage from the incision site, leg swelling day or night. Go to the emergency room if any of these symptoms are severe.   - Take your medications as ordered and take a stool softener if needed with the narcotic medications.  - Call Dr. Schaeffer's office [phone] tomorrow or the next business day to make a followup appointment.  - Please get an CXR the day before your appointment and bring it with you to your follow up appointment.

## 2019-03-22 NOTE — DISCHARGE NOTE PROVIDER - NSDCACTIVITY_GEN_ALL_CORE
Walking - Indoors allowed/Stairs allowed/Sex allowed/Showering allowed/Walking - Outdoors allowed Walking - Outdoors allowed/Walking - Indoors allowed/Stairs allowed/Showering allowed

## 2019-03-28 LAB — SURGICAL PATHOLOGY STUDY: SIGNIFICANT CHANGE UP

## 2019-03-29 PROBLEM — K58.9 IRRITABLE BOWEL SYNDROME WITHOUT DIARRHEA: Chronic | Status: ACTIVE | Noted: 2019-03-07

## 2019-03-29 PROBLEM — K21.9 GASTRO-ESOPHAGEAL REFLUX DISEASE WITHOUT ESOPHAGITIS: Chronic | Status: ACTIVE | Noted: 2019-03-07

## 2019-03-29 PROBLEM — M54.10 RADICULOPATHY, SITE UNSPECIFIED: Chronic | Status: ACTIVE | Noted: 2019-03-07

## 2019-04-02 ENCOUNTER — OTHER (OUTPATIENT)
Age: 44
End: 2019-04-02

## 2019-04-03 ENCOUNTER — APPOINTMENT (OUTPATIENT)
Dept: THORACIC SURGERY | Facility: CLINIC | Age: 44
End: 2019-04-03
Payer: MEDICARE

## 2019-04-03 VITALS
RESPIRATION RATE: 16 BRPM | SYSTOLIC BLOOD PRESSURE: 123 MMHG | OXYGEN SATURATION: 98 % | TEMPERATURE: 98.3 F | DIASTOLIC BLOOD PRESSURE: 80 MMHG | HEART RATE: 84 BPM | WEIGHT: 172 LBS | BODY MASS INDEX: 30.48 KG/M2 | HEIGHT: 63 IN

## 2019-04-03 PROCEDURE — 99024 POSTOP FOLLOW-UP VISIT: CPT

## 2019-04-24 ENCOUNTER — APPOINTMENT (OUTPATIENT)
Dept: THORACIC SURGERY | Facility: CLINIC | Age: 44
End: 2019-04-24
Payer: MEDICARE

## 2019-04-24 VITALS
OXYGEN SATURATION: 97 % | SYSTOLIC BLOOD PRESSURE: 115 MMHG | WEIGHT: 170 LBS | HEART RATE: 90 BPM | TEMPERATURE: 98.6 F | BODY MASS INDEX: 30.12 KG/M2 | DIASTOLIC BLOOD PRESSURE: 79 MMHG | HEIGHT: 63 IN | RESPIRATION RATE: 16 BRPM

## 2019-04-24 PROCEDURE — 99024 POSTOP FOLLOW-UP VISIT: CPT

## 2019-05-15 ENCOUNTER — APPOINTMENT (OUTPATIENT)
Dept: THORACIC SURGERY | Facility: CLINIC | Age: 44
End: 2019-05-15
Payer: MEDICARE

## 2019-05-15 VITALS — WEIGHT: 168 LBS | BODY MASS INDEX: 29.77 KG/M2 | HEIGHT: 63 IN

## 2019-05-15 PROCEDURE — 99213 OFFICE O/P EST LOW 20 MIN: CPT | Mod: 24

## 2019-05-18 NOTE — H&P PST ADULT - FALL HARM RISK CONCLUSION
pt with hx of COPD, HTN, CKD, afib, sent to ED by cardiology for new onset CHF with ROMERO, fluid overload, RD consulted 2/2 BMI < 18 however BMI is actually 25.5 wnls. Universal Safety Interventions

## 2019-06-21 ENCOUNTER — MEDICATION RENEWAL (OUTPATIENT)
Age: 44
End: 2019-06-21

## 2019-07-17 ENCOUNTER — APPOINTMENT (OUTPATIENT)
Dept: GASTROENTEROLOGY | Facility: CLINIC | Age: 44
End: 2019-07-17
Payer: MEDICARE

## 2019-07-17 VITALS
OXYGEN SATURATION: 99 % | HEART RATE: 88 BPM | BODY MASS INDEX: 30.12 KG/M2 | RESPIRATION RATE: 16 BRPM | DIASTOLIC BLOOD PRESSURE: 62 MMHG | TEMPERATURE: 98.8 F | SYSTOLIC BLOOD PRESSURE: 120 MMHG | WEIGHT: 170 LBS | HEIGHT: 63 IN

## 2019-07-17 PROCEDURE — 99214 OFFICE O/P EST MOD 30 MIN: CPT

## 2019-07-17 RX ORDER — NORTRIPTYLINE HYDROCHLORIDE 25 MG/1
25 CAPSULE ORAL
Refills: 0 | Status: DISCONTINUED | COMMUNITY
End: 2019-07-17

## 2019-07-17 NOTE — ASSESSMENT
[FreeTextEntry1] : This is a 44-year-old female with history of chronic GERD, hiatal hernia, status post fundoplication with hiatal hernia repair. She denies complaints of heartburn or dysphagia. I recommend tapering down pantoprazole by taking it every other day for 2 weeks, every 3 days for another 2-4 weeks and stopping. She can take over-the-counter Zantac or Pepcid as needed. She has  a history of irritable bowel syndrome with symptoms of gas, bloating, cramping and irregular bowel movements. She is to continue and avoiding dairy products. I recommend a two-week trial on a gluten-free diet. I will refer her to Isatu Watt RD, for consultation for a low FODMAP diet.

## 2019-07-17 NOTE — HISTORY OF PRESENT ILLNESS
[FreeTextEntry1] : Elvia Presents for a followup visit. He underwent fundoplication and repair of a hiatal hernia in March. She no longer has heartburn symptoms and denies dysphagia but complains of increased gas with belching, hiccuping, abdominal cramping and irregular bowel movements. She has irritable bowel syndrome. She has been recommended on avoiding dairy products. She was recommended to see the nutritionist but did not schedule an appointment. She wants to see a nutritionist now for guidance on IBS diet.

## 2019-07-18 LAB
TTG IGA SER IA-ACNC: <1.2 U/ML
TTG IGA SER-ACNC: NEGATIVE
TTG IGG SER IA-ACNC: 1.2 U/ML
TTG IGG SER IA-ACNC: NEGATIVE

## 2019-07-19 LAB
ENDOMYSIUM IGA SER QL: NEGATIVE
ENDOMYSIUM IGA TITR SER: NORMAL

## 2019-11-04 ENCOUNTER — APPOINTMENT (OUTPATIENT)
Dept: GASTROENTEROLOGY | Facility: CLINIC | Age: 44
End: 2019-11-04

## 2019-11-06 ENCOUNTER — APPOINTMENT (OUTPATIENT)
Dept: THORACIC SURGERY | Facility: CLINIC | Age: 44
End: 2019-11-06
Payer: MEDICARE

## 2019-11-06 VITALS
RESPIRATION RATE: 16 BRPM | HEART RATE: 84 BPM | BODY MASS INDEX: 30.96 KG/M2 | DIASTOLIC BLOOD PRESSURE: 83 MMHG | TEMPERATURE: 98.7 F | SYSTOLIC BLOOD PRESSURE: 124 MMHG | WEIGHT: 174.8 LBS | OXYGEN SATURATION: 98 %

## 2019-11-06 PROCEDURE — 99213 OFFICE O/P EST LOW 20 MIN: CPT

## 2019-11-06 RX ORDER — ONDANSETRON 4 MG/1
4 TABLET ORAL EVERY 8 HOURS
Qty: 15 | Refills: 0 | Status: COMPLETED | COMMUNITY
Start: 2019-03-26 | End: 2019-11-06

## 2019-11-08 NOTE — CONSULT LETTER
[Dear  ___] : Dear  [unfilled], [Consult Letter:] : I had the pleasure of evaluating your patient, [unfilled]. [Please see my note below.] : Please see my note below. [Sincerely,] : Sincerely, [FreeTextEntry2] : Dr. Oquendo (GI/Ref)\par Dr. Culp(PCP)\par Dr. Jesús Gunn(cardio)  [FreeTextEntry3] : Madi Schaeffer MD, FACS \par , Division of Thoracic Surgery \par [facility] \par Chief, Thoracic Surgery \par [facility] \par Department of Cardiovascular & Thoracic Surgery \par  \par [facility] [employer] [facility] \par \par

## 2019-11-08 NOTE — HISTORY OF PRESENT ILLNESS
[FreeTextEntry1] : 43 yo female returns S/P Laparoscopic robotic assisted posterior fundoplication and repair of hiatal hernia- Toupet fundoplication and EGD on 3/20/19. Pathology revealed fibroadipose tissue with marked congestion and dense gastric fat pad and hernia sac. Reactive lymph nodes. She presented to her GI specialist, Dr Oquendo with complaints of reflux, burping, hiccups, regurgitation and left sided abdominal pain. \par \par She was never a smoker, and PMHx includes HTN, renal stone s/p stent placement, IBS, cervical discectomy with fusion, Lt breast papilloma resection. \par \par Pt presents today for follow up. Pt c/o has dysphagia on grilled chicken but tolerated other food and still having excessive hiccupping and burping, denies N/V, or acid reflux. \par \par

## 2019-11-08 NOTE — ASSESSMENT
[FreeTextEntry1] : 45 yo female returns S/P Laparoscopic robotic assisted posterior fundoplication and repair of hiatal hernia- Toupet fundoplication and EGD on 3/20/19. Pathology revealed fibroadipose tissue with marked congestion and dense gastric fat pad and hernia sac. Reactive lymph nodes. Overall doing very well with occasional food sticking sensation. \par \par Pt has some dysphagia when eating grilled chicken. I recommended pt to cook chicken in a different way so that  it will be soft for her to swallow. Avoid food that is hard or sticky. I would like to see pt back in 6 mons for a checkup. No scan needed. \par \par \par Written by Ellen Guaman NP, acting as a scribe for Dr. Tasneem Tovar. \par \par The documentation recorded by the scribe accurately reflects the service I personally performed and the decisions made by me. TASNEEM TOVAR MD\par \par \par

## 2019-11-08 NOTE — REVIEW OF SYSTEMS
[As Noted in HPI] : as noted in HPI [Negative] : Heme/Lymph [FreeTextEntry4] : dysphagia on grilled chicken

## 2019-11-25 ENCOUNTER — APPOINTMENT (OUTPATIENT)
Dept: MAMMOGRAPHY | Facility: CLINIC | Age: 44
End: 2019-11-25
Payer: MEDICARE

## 2019-11-25 ENCOUNTER — OUTPATIENT (OUTPATIENT)
Dept: OUTPATIENT SERVICES | Facility: HOSPITAL | Age: 44
LOS: 1 days | End: 2019-11-25
Payer: MEDICARE

## 2019-11-25 ENCOUNTER — APPOINTMENT (OUTPATIENT)
Dept: ULTRASOUND IMAGING | Facility: CLINIC | Age: 44
End: 2019-11-25
Payer: MEDICARE

## 2019-11-25 DIAGNOSIS — Z00.8 ENCOUNTER FOR OTHER GENERAL EXAMINATION: ICD-10-CM

## 2019-11-25 DIAGNOSIS — N60.19 DIFFUSE CYSTIC MASTOPATHY OF UNSPECIFIED BREAST: ICD-10-CM

## 2019-11-25 DIAGNOSIS — Z98.890 OTHER SPECIFIED POSTPROCEDURAL STATES: Chronic | ICD-10-CM

## 2019-11-25 PROCEDURE — 77067 SCR MAMMO BI INCL CAD: CPT

## 2019-11-25 PROCEDURE — 76641 ULTRASOUND BREAST COMPLETE: CPT | Mod: 26,50

## 2019-11-25 PROCEDURE — 77067 SCR MAMMO BI INCL CAD: CPT | Mod: 26

## 2019-11-25 PROCEDURE — 77063 BREAST TOMOSYNTHESIS BI: CPT | Mod: 26

## 2019-11-25 PROCEDURE — 77063 BREAST TOMOSYNTHESIS BI: CPT

## 2019-11-25 PROCEDURE — 76641 ULTRASOUND BREAST COMPLETE: CPT

## 2020-05-27 ENCOUNTER — APPOINTMENT (OUTPATIENT)
Dept: THORACIC SURGERY | Facility: CLINIC | Age: 45
End: 2020-05-27
Payer: MEDICARE

## 2020-05-27 DIAGNOSIS — K44.9 DIAPHRAGMATIC HERNIA W/OUT OBSTRUCTION OR GANGRENE: ICD-10-CM

## 2020-05-27 PROCEDURE — 99442: CPT | Mod: 95

## 2020-05-29 PROBLEM — K44.9 HIATAL HERNIA: Status: ACTIVE | Noted: 2019-02-06

## 2020-06-01 NOTE — CONSULT LETTER
[Dear  ___] : Dear  [unfilled], [Courtesy Letter:] : I had the pleasure of seeing your patient, [unfilled], in my office today. [Please see my note below.] : Please see my note below. [Sincerely,] : Sincerely, [FreeTextEntry3] : Madi Schaeffer MD, FACS \par , Division of Thoracic Surgery \par [facility] \par Chief, Thoracic Surgery \par [facility] \par Department of Cardiovascular & Thoracic Surgery \par  \par [facility] [employer] [facility] \par \par  [FreeTextEntry2] : Dr. Oquendo (GI/Ref)\par Dr. Culp(PCP)\par Dr. Jesús Gunn(cardio) \par

## 2020-06-01 NOTE — ASSESSMENT
[FreeTextEntry1] : 46 yo female S/P Laparoscopic robotic assisted posterior fundoplication and repair of hiatal hernia- Toupet fundoplication and EGD on 3/20/19. Pathology revealed fibroadipose tissue with marked congestion and dense gastric fat pad and hernia sac. Reactive lymph nodes. She presented initially  to her GI specialist, Dr Oquendo with complaints of reflux, burping, hiccups, regurgitation and left sided abdominal pain. \par \par Pt is doing well after surgery. Pt should continue follow up with GI Dr. Oquendo. Pt may follow up with me PRN.\par I spent 15 mins speaking with pt on the phone regarding above results and plan.\par \par \par I personally performed the services described in the documentation, reviewed the documentation recorded by the scribe in my presence and it accurately and completely records my words and actions. \par \par I, Ellen Guaman NP, am scribing for and the presence of Dr. Madi Schafefer the following sections, HISTORY OF PRESENT ILLNESS, PAST MEDICAL/FAMILY/SOCIAL HISTORY; REVIEW OF SYSTEMS; VITAL SIGNS; PHYSICAL EXAM; DISPOSITION.\par

## 2020-06-01 NOTE — HISTORY OF PRESENT ILLNESS
[Home] : at home, [unfilled] , at the time of the visit. [Medical Office: ([facility])___] : at the medical office located in  [Verbal consent obtained from patient] : the patient, [unfilled] [FreeTextEntry1] : 45 yo female returns S/P Laparoscopic robotic assisted posterior fundoplication and repair of hiatal hernia- Toupet fundoplication and EGD on 3/20/19. Pathology revealed fibroadipose tissue with marked congestion and dense gastric fat pad and hernia sac. Reactive lymph nodes. She presented to her GI specialist, Dr Oquendo with complaints of reflux, burping, hiccups, regurgitation and left sided abdominal pain. \par \par She was never a smoker, and PMHx includes HTN, renal stone s/p stent placement, IBS, cervical discectomy with fusion, Lt breast papilloma resection. \par \par Pt presents today for follow up via telephonic service. Pt admits to acid reflux, taking Tums several time/wk with some relief.

## 2020-12-04 ENCOUNTER — NON-APPOINTMENT (OUTPATIENT)
Age: 45
End: 2020-12-04

## 2020-12-23 ENCOUNTER — APPOINTMENT (OUTPATIENT)
Dept: MAMMOGRAPHY | Facility: IMAGING CENTER | Age: 45
End: 2020-12-23
Payer: MEDICARE

## 2020-12-23 ENCOUNTER — OUTPATIENT (OUTPATIENT)
Dept: OUTPATIENT SERVICES | Facility: HOSPITAL | Age: 45
LOS: 1 days | End: 2020-12-23
Payer: MEDICARE

## 2020-12-23 ENCOUNTER — APPOINTMENT (OUTPATIENT)
Dept: ULTRASOUND IMAGING | Facility: IMAGING CENTER | Age: 45
End: 2020-12-23
Payer: MEDICARE

## 2020-12-23 DIAGNOSIS — Z98.890 OTHER SPECIFIED POSTPROCEDURAL STATES: Chronic | ICD-10-CM

## 2020-12-23 DIAGNOSIS — Z00.8 ENCOUNTER FOR OTHER GENERAL EXAMINATION: ICD-10-CM

## 2020-12-23 PROCEDURE — 77063 BREAST TOMOSYNTHESIS BI: CPT | Mod: 26

## 2020-12-23 PROCEDURE — 77067 SCR MAMMO BI INCL CAD: CPT | Mod: 26

## 2020-12-23 PROCEDURE — 77063 BREAST TOMOSYNTHESIS BI: CPT

## 2020-12-23 PROCEDURE — 77067 SCR MAMMO BI INCL CAD: CPT

## 2020-12-31 ENCOUNTER — APPOINTMENT (OUTPATIENT)
Dept: ULTRASOUND IMAGING | Facility: IMAGING CENTER | Age: 45
End: 2020-12-31
Payer: MEDICARE

## 2020-12-31 ENCOUNTER — OUTPATIENT (OUTPATIENT)
Dept: OUTPATIENT SERVICES | Facility: HOSPITAL | Age: 45
LOS: 1 days | End: 2020-12-31
Payer: MEDICARE

## 2020-12-31 DIAGNOSIS — Z98.890 OTHER SPECIFIED POSTPROCEDURAL STATES: Chronic | ICD-10-CM

## 2020-12-31 DIAGNOSIS — Z00.8 ENCOUNTER FOR OTHER GENERAL EXAMINATION: ICD-10-CM

## 2020-12-31 PROCEDURE — 76641 ULTRASOUND BREAST COMPLETE: CPT

## 2020-12-31 PROCEDURE — 76641 ULTRASOUND BREAST COMPLETE: CPT | Mod: 26,50

## 2021-01-07 ENCOUNTER — APPOINTMENT (OUTPATIENT)
Dept: ULTRASOUND IMAGING | Facility: IMAGING CENTER | Age: 46
End: 2021-01-07
Payer: MEDICARE

## 2021-01-07 ENCOUNTER — OUTPATIENT (OUTPATIENT)
Dept: OUTPATIENT SERVICES | Facility: HOSPITAL | Age: 46
LOS: 1 days | End: 2021-01-07
Payer: MEDICARE

## 2021-01-07 DIAGNOSIS — Z98.890 OTHER SPECIFIED POSTPROCEDURAL STATES: Chronic | ICD-10-CM

## 2021-01-07 DIAGNOSIS — R10.2 PELVIC AND PERINEAL PAIN: ICD-10-CM

## 2021-01-07 PROCEDURE — 76830 TRANSVAGINAL US NON-OB: CPT | Mod: 26

## 2021-01-07 PROCEDURE — 76856 US EXAM PELVIC COMPLETE: CPT | Mod: 26

## 2021-01-07 PROCEDURE — 76830 TRANSVAGINAL US NON-OB: CPT

## 2021-01-07 PROCEDURE — 76856 US EXAM PELVIC COMPLETE: CPT

## 2021-01-22 ENCOUNTER — NON-APPOINTMENT (OUTPATIENT)
Age: 46
End: 2021-01-22

## 2021-01-25 ENCOUNTER — APPOINTMENT (OUTPATIENT)
Dept: DISASTER EMERGENCY | Facility: CLINIC | Age: 46
End: 2021-01-25

## 2021-01-26 LAB — SARS-COV-2 N GENE NPH QL NAA+PROBE: NOT DETECTED

## 2021-01-29 ENCOUNTER — APPOINTMENT (OUTPATIENT)
Dept: GASTROENTEROLOGY | Facility: AMBULATORY MEDICAL SERVICES | Age: 46
End: 2021-01-29
Payer: MEDICARE

## 2021-01-29 PROCEDURE — 45380 COLONOSCOPY AND BIOPSY: CPT

## 2021-01-29 PROCEDURE — 43239 EGD BIOPSY SINGLE/MULTIPLE: CPT

## 2021-02-09 ENCOUNTER — NON-APPOINTMENT (OUTPATIENT)
Age: 46
End: 2021-02-09

## 2021-05-10 ENCOUNTER — APPOINTMENT (OUTPATIENT)
Dept: GASTROENTEROLOGY | Facility: CLINIC | Age: 46
End: 2021-05-10
Payer: MEDICARE

## 2021-05-10 VITALS
BODY MASS INDEX: 31.01 KG/M2 | SYSTOLIC BLOOD PRESSURE: 110 MMHG | TEMPERATURE: 97.7 F | OXYGEN SATURATION: 98 % | HEIGHT: 63 IN | HEART RATE: 87 BPM | DIASTOLIC BLOOD PRESSURE: 70 MMHG | WEIGHT: 175 LBS

## 2021-05-10 PROCEDURE — 99214 OFFICE O/P EST MOD 30 MIN: CPT

## 2021-05-10 NOTE — PHYSICAL EXAM
[General Appearance - Alert] : alert [General Appearance - In No Acute Distress] : in no acute distress [Outer Ear] : the ears and nose were normal in appearance [Auscultation Breath Sounds / Voice Sounds] : lungs were clear to auscultation bilaterally [Heart Rate And Rhythm] : heart rate was normal and rhythm regular [Heart Sounds] : normal S1 and S2 [Heart Sounds Gallop] : no gallops [Murmurs] : no murmurs [Heart Sounds Pericardial Friction Rub] : no pericardial rub [Edema] : there was no peripheral edema [Bowel Sounds] : normal bowel sounds [Abdomen Soft] : soft [] : no hepato-splenomegaly [Abdomen Tenderness] : non-tender [Abdomen Mass (___ Cm)] : no abdominal mass palpated [Skin Color & Pigmentation] : normal skin color and pigmentation [No Focal Deficits] : no focal deficits

## 2021-05-10 NOTE — ASSESSMENT
[FreeTextEntry1] : This is a 46-year-old female with history of chronic GERD status post Nissen fundoplication with hiatal hernia repair reporting worsening GERD symptoms on famotidine 40 mg at bedtime.  Recent upper endoscopy showing no evidence of a hiatal hernia but recent CT abdomen pelvis showing a small hiatal hernia.  I recommend an upper GI series.  If there is evidence of a hiatal hernia, she will go back to see Dr. Schaeffer in thoracic surgery.  In the meantime she is to go back on pantoprazole 40 mg in the morning and continue on famotidine 40 mg at bedtime.  She is again informed possible long-term complications of PPI therapy including but not limited to bone loss.  She can try to stretch the frequency of pantoprazole to every other day or every 3 days while continuing on famotidine 40 mg at bedtime.  For the constipation history of diverticulitis, she is to take MiraLAX as needed, increase her water intake, fiber intake and exercise.  She already had an bout of acute diverticulitis in December.  I also informed her of small polyp that was removed in January that was a tubular adenoma.  This was explained to her at length.  She requires surveillance colonoscopy in 5 years.

## 2021-05-10 NOTE — HISTORY OF PRESENT ILLNESS
[FreeTextEntry1] : guillermo presents for follow-up visit.  She reports getting heartburn symptoms frequently on famotidine 40 mg at bedtime.  No dysphagia or odynophagia.  She underwent an upper endoscopy January 2021 showing an intact wrap without evidence of a hiatal hernia.  She had a Nissen fundoplication.  However she underwent a CT abdomen pelvis in December for abdominal pain as well as to reevaluate kidney stones.  The CAT scan in December showing a small hiatal hernia and mild sigmoid diverticulitis.  She was treated with antibiotics.  Follow-up colonoscopy in January showing diverticulosis without evidence of diverticulitis.  She reports constipation with hard stools and straining.  She admits that she does not do enough exercise but drinks plenty of water.

## 2021-06-29 ENCOUNTER — APPOINTMENT (OUTPATIENT)
Dept: RADIOLOGY | Facility: HOSPITAL | Age: 46
End: 2021-06-29
Payer: MEDICARE

## 2021-06-29 ENCOUNTER — OUTPATIENT (OUTPATIENT)
Dept: OUTPATIENT SERVICES | Facility: HOSPITAL | Age: 46
LOS: 1 days | End: 2021-06-29
Payer: MEDICARE

## 2021-06-29 DIAGNOSIS — K21.9 GASTRO-ESOPHAGEAL REFLUX DISEASE WITHOUT ESOPHAGITIS: ICD-10-CM

## 2021-06-29 DIAGNOSIS — Z98.890 OTHER SPECIFIED POSTPROCEDURAL STATES: Chronic | ICD-10-CM

## 2021-06-29 PROCEDURE — 74246 X-RAY XM UPR GI TRC 2CNTRST: CPT

## 2021-06-29 PROCEDURE — 74246 X-RAY XM UPR GI TRC 2CNTRST: CPT | Mod: 26

## 2021-08-04 ENCOUNTER — RX RENEWAL (OUTPATIENT)
Age: 46
End: 2021-08-04

## 2021-08-17 NOTE — H&P ADULT - NSHPPOADEEPVENOUSTHROMB_GEN_A_CORE
PT WIFE CALLED TO CHECK ON STATUS REFILL FOR RX  gabapentin (NEURONTIN) 600 MG tablet.    PLEASE ADVISE.  CALL BACK:[phone]   no

## 2021-10-05 ENCOUNTER — RX RENEWAL (OUTPATIENT)
Age: 46
End: 2021-10-05

## 2021-11-05 ENCOUNTER — NON-APPOINTMENT (OUTPATIENT)
Age: 46
End: 2021-11-05

## 2021-11-10 ENCOUNTER — APPOINTMENT (OUTPATIENT)
Dept: INTERNAL MEDICINE | Facility: CLINIC | Age: 46
End: 2021-11-10
Payer: MEDICARE

## 2021-11-10 VITALS
DIASTOLIC BLOOD PRESSURE: 70 MMHG | WEIGHT: 178 LBS | HEART RATE: 84 BPM | HEIGHT: 63 IN | BODY MASS INDEX: 31.54 KG/M2 | OXYGEN SATURATION: 98 % | SYSTOLIC BLOOD PRESSURE: 112 MMHG | TEMPERATURE: 97.3 F

## 2021-11-10 DIAGNOSIS — Z01.818 ENCOUNTER FOR OTHER PREPROCEDURAL EXAMINATION: ICD-10-CM

## 2021-11-10 DIAGNOSIS — N95.1 MENOPAUSAL AND FEMALE CLIMACTERIC STATES: ICD-10-CM

## 2021-11-10 DIAGNOSIS — K29.70 GASTRITIS, UNSPECIFIED, W/OUT BLEEDING: ICD-10-CM

## 2021-11-10 DIAGNOSIS — Z91.018 ALLERGY TO OTHER FOODS: ICD-10-CM

## 2021-11-10 DIAGNOSIS — Z09 ENCOUNTER FOR FOLLOW-UP EXAMINATION AFTER COMPLETED TREATMENT FOR CONDITIONS OTHER THAN MALIGNANT NEOPLASM: ICD-10-CM

## 2021-11-10 DIAGNOSIS — E78.1 PURE HYPERGLYCERIDEMIA: ICD-10-CM

## 2021-11-10 DIAGNOSIS — R10.9 UNSPECIFIED ABDOMINAL PAIN: ICD-10-CM

## 2021-11-10 DIAGNOSIS — Z87.898 PERSONAL HISTORY OF OTHER SPECIFIED CONDITIONS: ICD-10-CM

## 2021-11-10 DIAGNOSIS — I10 ESSENTIAL (PRIMARY) HYPERTENSION: ICD-10-CM

## 2021-11-10 DIAGNOSIS — K57.92 DIVERTICULITIS OF INTESTINE, PART UNSPECIFIED, W/OUT PERFORATION OR ABSCESS W/OUT BLEEDING: ICD-10-CM

## 2021-11-10 DIAGNOSIS — Z91.09 OTHER ALLERGY STATUS, OTHER THAN TO DRUGS AND BIOLOGICAL SUBSTANCES: ICD-10-CM

## 2021-11-10 DIAGNOSIS — Z12.11 ENCOUNTER FOR SCREENING FOR MALIGNANT NEOPLASM OF COLON: ICD-10-CM

## 2021-11-10 PROCEDURE — G0439: CPT

## 2021-11-10 RX ORDER — LEVOFLOXACIN 500 MG/1
500 TABLET, FILM COATED ORAL DAILY
Qty: 3 | Refills: 0 | Status: DISCONTINUED | COMMUNITY
Start: 2020-12-06 | End: 2021-11-10

## 2021-11-10 RX ORDER — METRONIDAZOLE 500 MG/1
500 TABLET ORAL TWICE DAILY
Qty: 20 | Refills: 0 | Status: DISCONTINUED | COMMUNITY
Start: 2020-12-06 | End: 2021-11-10

## 2021-11-11 PROBLEM — Z91.09 ENVIRONMENTAL ALLERGIES: Status: ACTIVE | Noted: 2021-11-10

## 2021-11-11 PROBLEM — Z09 POSTOP CHECK: Status: RESOLVED | Noted: 2019-03-29 | Resolved: 2021-11-11

## 2021-11-11 PROBLEM — K29.70 GASTRITIS: Noted: 2018-11-06

## 2021-11-11 PROBLEM — R10.9 ABDOMINAL PAIN: Noted: 2019-01-16

## 2021-11-11 PROBLEM — N95.1 PERIMENOPAUSAL SYMPTOMS: Status: ACTIVE | Noted: 2021-11-11

## 2021-11-11 PROBLEM — Z91.018 FOOD ALLERGY: Status: ACTIVE | Noted: 2021-11-10

## 2021-11-11 PROBLEM — Z01.818 PRE-OP EXAM: Status: RESOLVED | Noted: 2021-01-24 | Resolved: 2021-11-11

## 2021-11-11 PROBLEM — Z87.898 HISTORY OF NAUSEA: Status: RESOLVED | Noted: 2019-03-26 | Resolved: 2021-11-11

## 2021-11-11 PROBLEM — I10 ESSENTIAL HYPERTENSION: Status: ACTIVE | Noted: 2021-11-11

## 2021-11-11 RX ORDER — FENOFIBRATE 48 MG/1
48 TABLET ORAL DAILY
Qty: 30 | Refills: 0 | Status: ACTIVE | COMMUNITY
Start: 2021-11-11

## 2021-11-12 PROBLEM — K57.92 ACUTE DIVERTICULITIS: Status: RESOLVED | Noted: 2020-12-06 | Resolved: 2021-11-12

## 2021-11-12 PROBLEM — E78.1 HYPERTRIGLYCERIDEMIA: Status: ACTIVE | Noted: 2021-11-11

## 2021-11-12 PROBLEM — Z12.11 COLON CANCER SCREENING: Status: RESOLVED | Noted: 2020-12-06 | Resolved: 2021-11-12
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
## 2021-11-12 NOTE — HEALTH RISK ASSESSMENT
[2 - 4 times a month (2 pts)] : 2-4 times a month (2 points) [1 or 2 (0 pts)] : 1 or 2 (0 points) [No] : In the past 12 months have you used drugs other than those required for medical reasons? No [No falls in past year] : Patient reported no falls in the past year [0] : 2) Feeling down, depressed, or hopeless: Not at all (0) [PHQ-2 Negative - No further assessment needed] : PHQ-2 Negative - No further assessment needed [Alone] : lives alone [On disability] : on disability [Fully functional (bathing, dressing, toileting, transferring, walking, feeding)] : Fully functional (bathing, dressing, toileting, transferring, walking, feeding) [Fully functional (using the telephone, shopping, preparing meals, housekeeping, doing laundry, using] : Fully functional and needs no help or supervision to perform IADLs (using the telephone, shopping, preparing meals, housekeeping, doing laundry, using transportation, managing medications and managing finances) [] :  [# Of Children ___] : has [unfilled] children [] : No [de-identified] : high fiber, low dairy, veggies, rare red meat [[id number]] : 0 [FreeTextEntry2] : [employer]

## 2021-11-12 NOTE — REVIEW OF SYSTEMS
[Hot Flashes] : hot flashes [Night Sweats] : night sweats [Diarrhea] : diarrhea [Heartburn] : heartburn [FreeTextEntry4] : ear discharge [FreeTextEntry8] : dyspareunia [FreeTextEntry9] : bilat hand muscle aches, wonders if it could be from the statin [de-identified] : numbness intermittent bilat UE

## 2021-11-12 NOTE — PHYSICAL EXAM
[Normal TMs] : both tympanic membranes were normal [No Varicosities] : no varicosities [Pedal Pulses Present] : the pedal pulses are present [No Extremity Clubbing/Cyanosis] : no extremity clubbing/cyanosis [Normal] : affect was normal and insight and judgment were intact [de-identified] : erythema of canals, L>R [de-identified] : trace non-pitting ankle edema with indentation of sock line bilat

## 2021-11-15 ENCOUNTER — APPOINTMENT (OUTPATIENT)
Dept: GASTROENTEROLOGY | Facility: CLINIC | Age: 46
End: 2021-11-15
Payer: MEDICARE

## 2021-11-15 VITALS
HEART RATE: 88 BPM | OXYGEN SATURATION: 97 % | WEIGHT: 179 LBS | BODY MASS INDEX: 31.71 KG/M2 | TEMPERATURE: 97.7 F | SYSTOLIC BLOOD PRESSURE: 125 MMHG | HEIGHT: 63 IN | DIASTOLIC BLOOD PRESSURE: 72 MMHG

## 2021-11-15 PROCEDURE — 99214 OFFICE O/P EST MOD 30 MIN: CPT

## 2021-11-15 NOTE — HISTORY OF PRESENT ILLNESS
[FreeTextEntry1] : Elvia presents for follow-up visit.  She reports continued heartburn and reports occasional dysphagia with both solids and liquids.  She underwent antireflux surgery and hiatal hernia repair.  Upper endoscopy from January of this year without evidence of a hiatal hernia and an intact wrap.  She has reflux esophagitis.  I sent her for an upper GI double contrast study June of this year showing slight delayed passage of barium tablet into the stomach with intermittently delayed passage of contrast through the GE junction into the stomach.  There are tertiary contractions and esophageal reflux.  No evidence of a hiatal hernia.  Review of prior motility testing before surgery was normal.  She reports continued constipation despite MiraLAX.  She has a history of diverticulosis and diverticulitis in the past.

## 2021-11-15 NOTE — PHYSICAL EXAM
[General Appearance - Alert] : alert [General Appearance - In No Acute Distress] : in no acute distress [Outer Ear] : the ears and nose were normal in appearance [Auscultation Breath Sounds / Voice Sounds] : lungs were clear to auscultation bilaterally [Heart Rate And Rhythm] : heart rate was normal and rhythm regular [Heart Sounds] : normal S1 and S2 [Heart Sounds Gallop] : no gallops [Murmurs] : no murmurs [Heart Sounds Pericardial Friction Rub] : no pericardial rub [Edema] : there was no peripheral edema [Bowel Sounds] : normal bowel sounds [Abdomen Soft] : soft [Abdomen Tenderness] : non-tender [] : no hepato-splenomegaly [Abdomen Mass (___ Cm)] : no abdominal mass palpated [Skin Color & Pigmentation] : normal skin color and pigmentation

## 2021-11-15 NOTE — ASSESSMENT
[FreeTextEntry1] : This is a 46-year-old female with history of chronic GERD status post hiatal hernia repair and antireflux surgery.  She now reports worsening heartburn symptoms and dysphagia.  Esophageal motility testing prior to surgery was normal.  She may have a tight wrap.  I recommend repeating motility testing.  For now she is to continue on pantoprazole in the morning and famotidine at bedtime.  She can stretch the pantoprazole to every other day which is fine with her.  For the constipation, I recommend trial of Linzess starting at 72 mcg daily.  She also has history of intermittent diarrhea in the past from IBS.  If she develops diarrhea after 1 to 2 weeks she is to give me a call.  If there is no relief of bowel movements after 1 to 2 weeks, she is to give me a call at which time I will increase the dose.  She is to increase her water intake.  She is to call me if she has any questions or concerns otherwise I will see her for follow-up in 6 months.

## 2021-11-26 ENCOUNTER — LABORATORY RESULT (OUTPATIENT)
Age: 46
End: 2021-11-26

## 2021-11-26 ENCOUNTER — APPOINTMENT (OUTPATIENT)
Dept: DISASTER EMERGENCY | Facility: CLINIC | Age: 46
End: 2021-11-26

## 2021-11-29 ENCOUNTER — APPOINTMENT (OUTPATIENT)
Dept: GASTROENTEROLOGY | Facility: HOSPITAL | Age: 46
End: 2021-11-29

## 2021-11-29 ENCOUNTER — OUTPATIENT (OUTPATIENT)
Dept: OUTPATIENT SERVICES | Facility: HOSPITAL | Age: 46
LOS: 1 days | Discharge: HOME | End: 2021-11-29
Payer: MEDICARE

## 2021-11-29 VITALS
SYSTOLIC BLOOD PRESSURE: 123 MMHG | DIASTOLIC BLOOD PRESSURE: 68 MMHG | OXYGEN SATURATION: 100 % | TEMPERATURE: 96 F | WEIGHT: 175.05 LBS | HEART RATE: 76 BPM | HEIGHT: 63 IN | RESPIRATION RATE: 18 BRPM

## 2021-11-29 DIAGNOSIS — Z98.890 OTHER SPECIFIED POSTPROCEDURAL STATES: Chronic | ICD-10-CM

## 2021-11-29 DIAGNOSIS — K21.9 GASTRO-ESOPHAGEAL REFLUX DISEASE WITHOUT ESOPHAGITIS: ICD-10-CM

## 2021-11-29 PROCEDURE — 91037 ESOPH IMPED FUNCTION TEST: CPT | Mod: 26,GC

## 2021-11-29 PROCEDURE — 91010 ESOPHAGUS MOTILITY STUDY: CPT | Mod: 26,GC

## 2021-12-03 ENCOUNTER — NON-APPOINTMENT (OUTPATIENT)
Age: 46
End: 2021-12-03

## 2021-12-03 NOTE — H&P PST ADULT - LAST ECHOCARDIOGRAM
Detail Level: Detailed Quality 226: Preventive Care And Screening: Tobacco Use: Screening And Cessation Intervention: Patient screened for tobacco use and is an ex/non-smoker 2018

## 2021-12-15 ENCOUNTER — APPOINTMENT (OUTPATIENT)
Dept: INTERNAL MEDICINE | Facility: CLINIC | Age: 46
End: 2021-12-15

## 2022-01-04 ENCOUNTER — APPOINTMENT (OUTPATIENT)
Dept: ULTRASOUND IMAGING | Facility: IMAGING CENTER | Age: 47
End: 2022-01-04
Payer: MEDICARE

## 2022-01-04 ENCOUNTER — OUTPATIENT (OUTPATIENT)
Dept: OUTPATIENT SERVICES | Facility: HOSPITAL | Age: 47
LOS: 1 days | End: 2022-01-04
Payer: MEDICARE

## 2022-01-04 ENCOUNTER — APPOINTMENT (OUTPATIENT)
Dept: MAMMOGRAPHY | Facility: IMAGING CENTER | Age: 47
End: 2022-01-04
Payer: MEDICARE

## 2022-01-04 DIAGNOSIS — Z98.890 OTHER SPECIFIED POSTPROCEDURAL STATES: Chronic | ICD-10-CM

## 2022-01-04 DIAGNOSIS — Z00.8 ENCOUNTER FOR OTHER GENERAL EXAMINATION: ICD-10-CM

## 2022-01-04 PROCEDURE — 77067 SCR MAMMO BI INCL CAD: CPT | Mod: 26

## 2022-01-04 PROCEDURE — 77063 BREAST TOMOSYNTHESIS BI: CPT | Mod: 26

## 2022-01-04 PROCEDURE — 77063 BREAST TOMOSYNTHESIS BI: CPT

## 2022-01-04 PROCEDURE — 76641 ULTRASOUND BREAST COMPLETE: CPT | Mod: 26,50

## 2022-01-04 PROCEDURE — 76641 ULTRASOUND BREAST COMPLETE: CPT

## 2022-01-04 PROCEDURE — 77067 SCR MAMMO BI INCL CAD: CPT

## 2022-01-18 ENCOUNTER — APPOINTMENT (OUTPATIENT)
Dept: INTERNAL MEDICINE | Facility: CLINIC | Age: 47
End: 2022-01-18
Payer: MEDICARE

## 2022-01-18 ENCOUNTER — RX RENEWAL (OUTPATIENT)
Age: 47
End: 2022-01-18

## 2022-01-18 VITALS
DIASTOLIC BLOOD PRESSURE: 70 MMHG | HEART RATE: 86 BPM | SYSTOLIC BLOOD PRESSURE: 140 MMHG | HEIGHT: 63 IN | BODY MASS INDEX: 31.54 KG/M2 | WEIGHT: 178 LBS | OXYGEN SATURATION: 98 %

## 2022-01-18 DIAGNOSIS — M54.12 RADICULOPATHY, CERVICAL REGION: ICD-10-CM

## 2022-01-18 DIAGNOSIS — E66.9 OBESITY, UNSPECIFIED: ICD-10-CM

## 2022-01-18 DIAGNOSIS — Z00.00 ENCOUNTER FOR GENERAL ADULT MEDICAL EXAMINATION W/OUT ABNORMAL FINDINGS: ICD-10-CM

## 2022-01-18 DIAGNOSIS — E78.00 PURE HYPERCHOLESTEROLEMIA, UNSPECIFIED: ICD-10-CM

## 2022-01-18 PROCEDURE — 99202 OFFICE O/P NEW SF 15 MIN: CPT

## 2022-01-18 RX ORDER — METOPROLOL SUCCINATE 100 MG/1
100 TABLET, EXTENDED RELEASE ORAL
Qty: 30 | Refills: 0 | Status: ACTIVE | COMMUNITY
Start: 2021-01-14

## 2022-01-18 RX ORDER — ATORVASTATIN CALCIUM 20 MG/1
20 TABLET, FILM COATED ORAL
Refills: 0 | Status: ACTIVE | COMMUNITY

## 2022-01-18 RX ORDER — NORTRIPTYLINE HYDROCHLORIDE 50 MG/1
50 CAPSULE ORAL
Refills: 0 | Status: ACTIVE | COMMUNITY

## 2022-01-18 RX ORDER — CELECOXIB 200 MG/1
200 CAPSULE ORAL
Refills: 0 | Status: ACTIVE | COMMUNITY

## 2022-01-18 RX ORDER — CARISOPRODOL 350 MG/1
350 TABLET ORAL
Refills: 0 | Status: ACTIVE | COMMUNITY

## 2022-01-18 RX ORDER — SODIUM SULFATE, POTASSIUM SULFATE, MAGNESIUM SULFATE 17.5; 3.13; 1.6 G/ML; G/ML; G/ML
17.5-3.13-1.6 SOLUTION, CONCENTRATE ORAL
Qty: 1 | Refills: 0 | Status: COMPLETED | COMMUNITY
Start: 2020-12-06 | End: 2022-01-18

## 2022-01-18 NOTE — HISTORY OF PRESENT ILLNESS
[FreeTextEntry1] : Visit to establish care with new primary care doctor\par  [de-identified] : 46F with HTN, HLD, GERD s/p hiatal hernia repair, cervical radicular pain, perimenopause presents for visit to establish care with new PMD. She had CPE 11/10/21 with Dr. Escalante (note reviewed)\par \par Follows with Dr. Jeannette Clements - Address: [address] [place] [address]\par Phone: [phone]. Hydrocodone and vicodin prn. \par Reference #: [id number]

## 2022-01-18 NOTE — COUNSELING
[Potential consequences of obesity discussed] : Potential consequences of obesity discussed [Benefits of weight loss discussed] : Benefits of weight loss discussed [Structured Weight Management Program suggested:] : Structured weight management program suggested [Encouraged to increase physical activity] : Encouraged to increase physical activity [Good understanding] : Patient has a good understanding of disease, goals and obesity follow-up plan [FreeTextEntry1] : weight watchers ( pt has successfully used before)

## 2022-01-18 NOTE — HEALTH RISK ASSESSMENT
[Good] : ~his/her~  mood as  good [Never] : Never [Yes] : Yes [Monthly or less (1 pt)] : Monthly or less (1 point) [1 or 2 (0 pts)] : 1 or 2 (0 points) [Never (0 pts)] : Never (0 points) [No] : In the past 12 months have you used drugs other than those required for medical reasons? No [0] : 2) Feeling down, depressed, or hopeless: Not at all (0) [PHQ-2 Negative - No further assessment needed] : PHQ-2 Negative - No further assessment needed [None] : None [Alone] : lives alone [Retired] : retired [Single] : single [Feels Safe at Home] : Feels safe at home [Fully functional (bathing, dressing, toileting, transferring, walking, feeding)] : Fully functional (bathing, dressing, toileting, transferring, walking, feeding) [Fully functional (using the telephone, shopping, preparing meals, housekeeping, doing laundry, using] : Fully functional and needs no help or supervision to perform IADLs (using the telephone, shopping, preparing meals, housekeeping, doing laundry, using transportation, managing medications and managing finances) [Smoke Detector] : smoke detector [Carbon Monoxide Detector] : carbon monoxide detector [Guns at Home] : guns at home [Seat Belt] :  uses seat belt [Sunscreen] : uses sunscreen [Audit-CScore] : 1 [de-identified] : does not exercise due to the pain. can walk but gets some ankle swelling.  [de-identified] : vegetables, 5x a week.  [[id number]] : 0 [Sexually Active] : not sexually active [High Risk Behavior] : no high risk behavior [Reports changes in hearing] : Reports no changes in hearing [Reports changes in vision] : Reports no changes in vision [Reports changes in dental health] : Reports no changes in dental health [FreeTextEntry2] : Retired  [de-identified] : sexually active with men in the past on OCP in the past, condoms in the past
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
## 2022-01-19 ENCOUNTER — APPOINTMENT (OUTPATIENT)
Dept: GASTROENTEROLOGY | Facility: CLINIC | Age: 47
End: 2022-01-19
Payer: MEDICARE

## 2022-01-19 VITALS
HEIGHT: 63 IN | HEART RATE: 88 BPM | BODY MASS INDEX: 31.54 KG/M2 | TEMPERATURE: 98 F | OXYGEN SATURATION: 97 % | WEIGHT: 178 LBS | DIASTOLIC BLOOD PRESSURE: 80 MMHG | SYSTOLIC BLOOD PRESSURE: 122 MMHG

## 2022-01-19 PROCEDURE — 99214 OFFICE O/P EST MOD 30 MIN: CPT

## 2022-01-19 NOTE — HISTORY OF PRESENT ILLNESS
[FreeTextEntry1] : Elvia presents for follow-up visit.  She reports continued intermittent difficulty swallowing as well as chronic GERD.  She takes famotidine 40 mg at bedtime.  She underwent a esophageal motility testing in November 2021 with normal basal lower esophageal sphincter pressures, on supine swallows 80% of swallows demonstrate normal amplitude peristalsis with the remaining 20% demonstrating low amplitude peristalsis; 90% of swallows demonstrate normal bolus clearance by impedance analysis; there is manometric evidence of 0.9 cm hiatal hernia; on upright swallows, 5 out of 6 swallows demonstrate absent peristalsis, with 1 of 6 swallows demonstrating a low amplitude peristalsis.  By [place] classification, this is considered a normal motility study.  On upright and saltine swallows however, there is diminished peristalsis and absent peristalsis, suspect ineffective motility.  She continues with constipation.  She relates that on Linzess at 72 mcg daily, she sometimes has too much bowel movements and other times with constipation to the point where she would vomit.

## 2022-01-19 NOTE — ASSESSMENT
[FreeTextEntry1] : This is a 46-year-old female with history of chronic GERD status post antireflux surgery and hiatal hernia repair, reporting symptoms of dysphagia for both solids and liquids with occasional heartburn symptoms.  She is on famotidine 40 mg daily.  Esophageal motility testing in November with normal motility study by [place] classification.  However on upright and saltine swallows there is diminished peristalsis and absent peristalsis, suspect ineffective motility.  I recommend she eat small meals and chew her food well.  I recommend she continues on famotidine 40 mg daily.  I recommend she loses weight.  This will help with heartburn symptoms.  She reports irregular bowel movements on Linzess at 72 mcg daily with diarrhea and constipation with feeling of incomplete evacuation.  I recommend stopping Linzess and give a trial of Amitiza 8 mcg twice a day with food.  I informed her possible side effect of Amitiza including but not limited to diarrhea and nausea.  If she develops diarrhea on this dose, she is to only take Amitiza once a day with dinner.  If she does not feel improvement in constipation, she is to increase the dose of Amitiza to 1 in the morning with breakfast and 2 at night with dinner.  She is to call me with the dose that works well for her without giving her diarrhea and produces good bowel movements.  She is to call me if she has any questions or concerns otherwise I will see her for follow-up in 6 months.

## 2022-02-15 RX ORDER — LUBIPROSTONE 8 UG/1
8 CAPSULE ORAL TWICE DAILY
Qty: 60 | Refills: 3 | Status: ACTIVE | COMMUNITY
Start: 2022-01-19 | End: 1900-01-01

## 2022-02-16 ENCOUNTER — RX RENEWAL (OUTPATIENT)
Age: 47
End: 2022-02-16

## 2022-06-09 RX ORDER — HYDROCORTISONE 25 MG/G
2.5 CREAM TOPICAL DAILY
Qty: 1 | Refills: 0 | Status: ACTIVE | COMMUNITY
Start: 2022-06-09 | End: 1900-01-01

## 2022-06-13 ENCOUNTER — APPOINTMENT (OUTPATIENT)
Dept: GASTROENTEROLOGY | Facility: CLINIC | Age: 47
End: 2022-06-13
Payer: MEDICARE

## 2022-06-13 VITALS
HEART RATE: 78 BPM | OXYGEN SATURATION: 99 % | HEIGHT: 63 IN | BODY MASS INDEX: 31.89 KG/M2 | SYSTOLIC BLOOD PRESSURE: 138 MMHG | DIASTOLIC BLOOD PRESSURE: 88 MMHG | WEIGHT: 180 LBS

## 2022-06-13 PROCEDURE — 99214 OFFICE O/P EST MOD 30 MIN: CPT

## 2022-06-13 NOTE — HISTORY OF PRESENT ILLNESS
[de-identified] : Elvia Romero is a 47 year old female presenting today for evaluation of a possible hemorrhoid. Patient called the office 6/9 reporting a "piece of flesh" hanging out of her rectum that was painful when she wiped, informed her it was likely a hemorrhoid recommended proctosol twice daily as needed. Reports prior to her call, she had been constipated since she could not afford lubiprostone that was previously prescribed. Since our phone call, pt reports the issue resolved with proctosol, also states she had multiple bowel movements over the weekend after taking Miralax.

## 2022-06-13 NOTE — PHYSICAL EXAM
[General Appearance - Alert] : alert [General Appearance - In No Acute Distress] : in no acute distress [Sclera] : the sclera and conjunctiva were normal [Outer Ear] : the ears and nose were normal in appearance [Neck Appearance] : the appearance of the neck was normal [] : no respiratory distress [Respiration, Rhythm And Depth] : normal respiratory rhythm and effort [Heart Rate And Rhythm] : heart rate was normal and rhythm regular [Bowel Sounds] : normal bowel sounds [Abdomen Soft] : soft [Abdomen Tenderness] : non-tender [No Rectal Mass] : no rectal mass [External Hemorrhoid] : no external hemorrhoids [No Focal Deficits] : no focal deficits [Oriented To Time, Place, And Person] : oriented to person, place, and time

## 2022-06-13 NOTE — REVIEW OF SYSTEMS
[Chest Pain] : no chest pain [Palpitations] : no palpitations [Shortness Of Breath] : no shortness of breath [Cough] : no cough [Abdominal Pain] : no abdominal pain [Vomiting] : no vomiting [Constipation] : constipation [Diarrhea] : no diarrhea [Negative] : Integumentary

## 2022-06-13 NOTE — ASSESSMENT
[FreeTextEntry1] : Elvia Romero is a 47 year old female presenting today for evaluation of a possible hemorrhoid. Patient called the office 6/9 reporting a "piece of flesh" hanging out of her rectum that was painful when she wiped, informed her it was likely a hemorrhoid recommended proctosol twice daily as needed. Reports prior to her call, she had been constipated since she could not afford lubiprostone that was previously prescribed. Since our phone call, pt reports the issue resolved with proctosol, also states she had multiple bowel movements over the weekend after taking Miralax. Recommend continuing both proctosol and miralax as needed for constipation and hemorrhoids. Educated pt on importance of high fiber and liquid intake to prevent constipation and irritate hemorrhoids. As previously discussed, will reach out to her insurance company to attempt either PA or see what alternatives they will cover. Will contact patient when medication is determined. Pt expressed understanding, all questions answered.

## 2022-06-30 ENCOUNTER — APPOINTMENT (OUTPATIENT)
Dept: ORTHOPEDIC SURGERY | Facility: CLINIC | Age: 47
End: 2022-06-30

## 2022-06-30 VITALS — WEIGHT: 180 LBS | BODY MASS INDEX: 31.89 KG/M2 | HEIGHT: 63 IN

## 2022-06-30 PROCEDURE — 73564 X-RAY EXAM KNEE 4 OR MORE: CPT | Mod: RT

## 2022-06-30 PROCEDURE — 99204 OFFICE O/P NEW MOD 45 MIN: CPT

## 2022-07-02 NOTE — DISCUSSION/SUMMARY
[de-identified] : The underlying pathophysiology was reviewed in great detail with the patient as well as the various treatment options, including ice, analgesics, NSAIDs, Voltaren Gel, Physical therapy, steroid injections.\par \par Activity modifications and restrictions were discussed. I advised avoiding deep bending, squatting and high intensity activity.\par \par A home exercise sheet was given and discussed with the patient to follow. \par \par I have recommended utilizing a knee sleeve to provide added support and stability. \par \par An MRI of the right was ordered to rule out medial meniscal tear, parameniscal cyst. Follow up when results are available. \par \par All questions were answered, all alternatives discussed and the patient is in complete agreement with that plan. Follow-up appointment as instructed. Any issues and the patient will call or come in sooner.

## 2022-07-02 NOTE — ADDENDUM
[FreeTextEntry1] : I, Gamal Bhatia, acted solely as a scribe for Dr. Cesar Church on this date 06/30/2022.

## 2022-07-02 NOTE — PHYSICAL EXAM
[Normal RLE] : Right Lower Extremity: No scars, rashes, lesions, ulcers, skin intact [Normal LLE] : Left Lower Extremity: No scars, rashes, lesions, ulcers, skin intact [Normal Touch] : sensation intact for touch [Normal] : Alert and in no acute distress [de-identified] : Right Lower Extremity\par o Knee :\par ¦ Inspection/Palpation : medial joint line tenderness to palpation, no lateral joint line tenderness, mild swelling, no deformity\par ¦ Range of Motion : 0 - 90 degrees, no crepitus\par ¦ Stability : no valgus or varus instability present on provocative testing, Lachman’s Test (-)\par ¦ Strength : flexion and extension 4/5\par Additional Tests: Adrianna's (mild + with discomfort)\par o Muscle Bulk : normal muscle bulk present\par o Skin : no erythema, no ecchymosis\par o Sensation : sensation to pin intact\par o Vascular Exam : no edema, no cyanosis, dorsalis pedis artery pulse 2+, posterior tibial artery pulse 2+\par \par Left Lower Extremity\par o Knee :\par ¦ Inspection/Palpation : no tenderness to palpation, no swelling, no deformity\par ¦ Range of Motion : 0 -120 degrees, no crepitus\par ¦ Stability : no valgus or varus instability present on provocative testing, Lachman’s Test (-)\par ¦ Strength : flexion and extension 5-/5\par o Muscle Bulk : normal muscle bulk present\par o Skin : no erythema, no ecchymosis\par o Sensation : sensation to pin intact\par o Vascular Exam : no edema, no cyanosis, dorsalis pedis artery pulse 2+, posterior tibial artery pulse 2+  [de-identified] : o Right Knee : AP, lateral, sunrise, and Joseph views of the knee were obtained, there are no soft tissue abnormalities, no fractures, alignment is normal, normal appearing joint spaces, minimal OA, normal bone density, no bony lesions. \par \par MRI right knee from [facility] dated Feb 2020 (reviewed images/report on phone):  horizontal tear posterior horn medial meniscus with parameniscal cyst.

## 2022-07-02 NOTE — HISTORY OF PRESENT ILLNESS
[Worsening] : worsening [6] : a current pain level of 6/10 [8] : a maximum pain level of 8/10 [de-identified] : GUSTAVO BIRD is a 47 year old female who presents for initial evaluation of acute right knee pain x two weeks.  She denies antecedent injury or trauma, pain is primarily about the lateral and patella aspect of the knee.  She has pain graded as an 8/10 pain scale.  Pain most prominent with ambulation and she is unable to bend.  She has been using a hinge knee brace.  She has been diagnosed with meniscal tear at an outside facility in 2020.  She has been taking Celebrex as needed with relief.  Tylenol with no relief.\par \par Has MRI [facility] dated Feb 2020:  horizontal tear posterior horn medial meniscus with parameniscal cyst.  \par \par Left knee surgery x 2:  knee scope for chondromalacia patella 2001/2 and Folkerson procedure x 2004

## 2022-07-20 ENCOUNTER — APPOINTMENT (OUTPATIENT)
Dept: GASTROENTEROLOGY | Facility: CLINIC | Age: 47
End: 2022-07-20

## 2022-08-02 NOTE — ASU PREOP CHECKLIST - LATEX ALLERGY
Instructions: This plan will send the code FBSE to the PM system.  DO NOT or CHANGE the price. Detail Level: Simple Price (Do Not Change): 0.00 no

## 2022-08-09 ENCOUNTER — APPOINTMENT (OUTPATIENT)
Dept: ORTHOPEDIC SURGERY | Facility: CLINIC | Age: 47
End: 2022-08-09

## 2022-08-09 VITALS — HEIGHT: 63 IN | BODY MASS INDEX: 31.18 KG/M2 | WEIGHT: 176 LBS

## 2022-08-09 PROCEDURE — 99214 OFFICE O/P EST MOD 30 MIN: CPT

## 2022-08-09 NOTE — HISTORY OF PRESENT ILLNESS
[Worsening] : worsening [6] : a current pain level of 6/10 [8] : a maximum pain level of 8/10 [de-identified] : GUSTAVO BIRD is a 47 year old female who presents for  follow up of  right knee pain. She has obtained MRI and is here for review of results. Symptoms have gotten worse since last visit.  She denies antecedent injury or trauma, pain.  She has pain graded as an 8/10 pain scale.  Pain most prominent with ambulation and she is unable to bend.  She has been using a hinge knee brace.  She has been diagnosed with meniscal tear at an outside facility in 2020.  She has been taking Celebrex as needed with relief.  Tylenol with no relief.\par \par Has MRI [facility] dated Feb 2020:  horizontal tear posterior horn medial meniscus with parameniscal cyst.  \par \par Left knee surgery x 2:  knee scope for chondromalacia patella 2001/2 and Folkerson procedure x 2004

## 2022-08-09 NOTE — DISCUSSION/SUMMARY
[de-identified] : The underlying pathophysiology was reviewed in great detail with the patient as well as the various treatment options, including ice, analgesics, NSAIDs, Voltaren Gel, Physical therapy, steroid injections, viscosupplementation, surgery - arthroscopic meniscectomy. \par \par MRI of the right was reviewed in great detail with the patient. Patient with moderate tricompartmental osteoarthritis in association with a horizontal/longitudinal medial meniscus tear. Discussed with patient non-operative and operative treatment. We had a lengthy discussion regarding arthroscopic surgery in the setting of osteoarthritis. Although she may find relief from the arthroscopy, we discussed the surgery may further progress her arthritis. Patient understands this. \par \par The patient wishes to proceed with SURGICAL INTERVENTION at this time. The risks and benefits of a RIGHT KNEE ARTHROSCOPIC MEDIAL MENISECTOMY were discussed in great detail today, including but not limited to bleeding, infection, nerve injury, DVT, allergy to the anesthetic or to the implants, persistent pain, stiffness, scarring, swelling or deformity. \par \par All questions were answered, all alternatives discussed and the patient is in complete agreement with that plan. Follow-up appointment as instructed. Any issues and the patient will call or come in sooner.

## 2022-08-09 NOTE — ADDENDUM
[FreeTextEntry1] : I, Gamal Bhatia, acted solely as a scribe for Dr. Cesar Church on this date 08/09/2022.

## 2022-08-09 NOTE — PHYSICAL EXAM
[Normal RLE] : Right Lower Extremity: No scars, rashes, lesions, ulcers, skin intact [Normal LLE] : Left Lower Extremity: No scars, rashes, lesions, ulcers, skin intact [Normal Touch] : sensation intact for touch [Normal] : Alert and in no acute distress [de-identified] : Right Lower Extremity\par o Knee :\par ¦ Inspection/Palpation : medial joint line tenderness to palpation, no lateral joint line tenderness, mild swelling, no deformity\par ¦ Range of Motion : 0 - 90 degrees, no crepitus\par ¦ Stability : no valgus or varus instability present on provocative testing, Lachman’s Test (-)\par ¦ Strength : flexion and extension 4/5\par Additional Tests: Adrianna's (mild + with discomfort)\par o Muscle Bulk : normal muscle bulk present\par o Skin : no erythema, no ecchymosis\par o Sensation : sensation to pin intact\par o Vascular Exam : no edema, no cyanosis, dorsalis pedis artery pulse 2+, posterior tibial artery pulse 2+\par \par Left Lower Extremity\par o Knee :\par ¦ Inspection/Palpation : no tenderness to palpation, no swelling, no deformity\par ¦ Range of Motion : 0 -120 degrees, no crepitus\par ¦ Stability : no valgus or varus instability present on provocative testing, Lachman’s Test (-)\par ¦ Strength : flexion and extension 5-/5\par o Muscle Bulk : normal muscle bulk present\par o Skin : no erythema, no ecchymosis\par o Sensation : sensation to pin intact\par o Vascular Exam : no edema, no cyanosis, dorsalis pedis artery pulse 2+, posterior tibial artery pulse 2+  [de-identified] : DATE OF EXAM: 07/01/2022\par MRI-RIGHT KNEE NON CONTRAST\par \par HISTORY: Right knee pain M25.561\par \par TECHNIQUE: MR imaging of the right knee was performed with and without IV\par contrast on a 3.0 Nathaly Ultra High Field Wide Bore MRI unit utilizing the\par following pulse sequences: Sagittal proton density with and without fat\par suppression, axial proton density fat-suppressed, and coronal proton density\par fat-suppressed.\par \par \par COMPARISON: No prior studies are available for comparison.\par \par FINDINGS:\par \par COLLATERAL LIGAMENTS: The medial collateral ligament is intact. The lateral\par collateral ligament, biceps femoris tendon, iliotibial band, and popliteus\par tendon are intact.\par \par CRUCIATE LIGAMENTS: The anterior and posterior cruciate ligaments are intact.\par \par MENISCI: There is a horizontal/longitudinal tear at the posterior horn and body\par of the medial meniscus, extending to the undersurface. The lateral meniscus are\par intact.\par \par CARTILAGE:\par \par Moderate cartilage thinning at the medial compartment. Mild cartilage thinning\par at the lateral compartment. Grade 3 patellar cartilage loss.\par \par BONES: There is bone marrow edema within the medial femoral condyle and medial\par tibial plateau, likely stress related. No fracture or bone destruction.\par \par EXTENSOR MECHANISM: The quadriceps and patellar tendons are normal.\par \par JOINT: No joint effusion is identified. No popliteal cyst identified. The soft\par tissues are otherwise normal.\par \par The neurovascular structures demonstrate normal course.\par \par IMPRESSION:\par \par Moderate tricompartmental osteoarthritis in association with a\par horizontal/longitudinal medial meniscus tear. Significant cartilage loss at the\par medial compartment and anterior compartment. Reactive bone marrow edema on both\par sides of the medial compartment. Small joint effusion.\par \par Signed by: Augustine Corea DO\par Signed Date: 7/2/2022 7:36 PM EDT\par \par SIGNED BY: Augustine Corea D.O. Ext 2250 07/02/2022 07:36 PM

## 2022-08-24 ENCOUNTER — APPOINTMENT (OUTPATIENT)
Dept: GASTROENTEROLOGY | Facility: CLINIC | Age: 47
End: 2022-08-24

## 2022-08-24 VITALS
HEIGHT: 63 IN | TEMPERATURE: 98.3 F | HEART RATE: 77 BPM | WEIGHT: 176 LBS | DIASTOLIC BLOOD PRESSURE: 80 MMHG | OXYGEN SATURATION: 98 % | SYSTOLIC BLOOD PRESSURE: 120 MMHG | BODY MASS INDEX: 31.18 KG/M2

## 2022-08-24 DIAGNOSIS — K64.9 UNSPECIFIED HEMORRHOIDS: ICD-10-CM

## 2022-08-24 PROCEDURE — 99214 OFFICE O/P EST MOD 30 MIN: CPT

## 2022-08-24 NOTE — ASSESSMENT
[FreeTextEntry1] : This is a 47-year-old female with constipation predominant irritable bowel syndrome.  Trial of Linzess and Amitiza in the past without response.  Trial of lubiprostone up to 16 mg twice daily without response.  I recommend trial of lubiprostone 24 mcg twice a day with food.  I informed her possible side effect of lubiprostone including but not limited to nausea and diarrhea.  If she develops diarrhea she is to lower the dose to one 24 mcg with dinner.  If there is no response after 1 to 2 weeks, she is to give me a call at which time I will give her a trial of either Trulance or Motegrity.  She is to start taking MiraLAX nightly along with the lubiprostone.  She has a prolapsing internal hemorrhoids causing bleeding and discomfort.  I recommend consultation with colorectal surgery.  Contact information was given.

## 2022-08-24 NOTE — PHYSICAL EXAM
[General Appearance - Alert] : alert [General Appearance - In No Acute Distress] : in no acute distress [Outer Ear] : the ears and nose were normal in appearance [Auscultation Breath Sounds / Voice Sounds] : lungs were clear to auscultation bilaterally [Heart Rate And Rhythm] : heart rate was normal and rhythm regular [Heart Sounds] : normal S1 and S2 [Heart Sounds Gallop] : no gallops [Murmurs] : no murmurs [Heart Sounds Pericardial Friction Rub] : no pericardial rub [Edema] : there was no peripheral edema [Bowel Sounds] : normal bowel sounds [Abdomen Soft] : soft [Abdomen Tenderness] : non-tender [] : no hepato-splenomegaly [Abdomen Mass (___ Cm)] : no abdominal mass palpated [External Hemorrhoid] : external hemorrhoids [FreeTextEntry1] : prolapsed internal hemorrhoids

## 2022-08-24 NOTE — HISTORY OF PRESENT ILLNESS
[FreeTextEntry1] : Elvia presents for follow-up visit.  She reports continued chronic constipation and hemorrhoidal discomfort and bleeding.  She was seen in June for an acute visit for hemorrhoidal bleeding.  She had tried Linzess for the constipation without response.  She had tried Amitiza in the past without response.  She states that the last time she was seen for hemorrhoids she was actually using Dulcolax which gave her bowel movements but also abdominal cramping.  The last medication she was taking for constipation was lubiprostone 16 mg twice a day.  She stopped taking the medicine as it was not completely covered by her insurance.  The dose of 16 mcg bid was not working well for her.  Colonoscopy from January 2021 with diverticulosis, small tubular adenoma and internal hemorrhoids seen on retroflexion.

## 2022-08-25 ENCOUNTER — RX RENEWAL (OUTPATIENT)
Age: 47
End: 2022-08-25

## 2022-09-14 ENCOUNTER — APPOINTMENT (OUTPATIENT)
Dept: COLORECTAL SURGERY | Facility: CLINIC | Age: 47
End: 2022-09-14

## 2022-09-14 VITALS
WEIGHT: 177 LBS | BODY MASS INDEX: 31.36 KG/M2 | SYSTOLIC BLOOD PRESSURE: 130 MMHG | HEART RATE: 82 BPM | DIASTOLIC BLOOD PRESSURE: 85 MMHG | OXYGEN SATURATION: 98 % | HEIGHT: 63 IN | RESPIRATION RATE: 15 BRPM

## 2022-09-14 DIAGNOSIS — K59.09 OTHER CONSTIPATION: ICD-10-CM

## 2022-09-14 DIAGNOSIS — K64.8 OTHER HEMORRHOIDS: ICD-10-CM

## 2022-09-14 PROCEDURE — 99204 OFFICE O/P NEW MOD 45 MIN: CPT | Mod: 25

## 2022-09-14 PROCEDURE — 46600 DIAGNOSTIC ANOSCOPY SPX: CPT
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
## 2022-09-14 NOTE — HISTORY OF PRESENT ILLNESS
[FreeTextEntry1] : Patient is a 46 yo female here with complaints of a prolapsing hemorrhoid.  She has had constipation for many years and sees Dr. Oquendo for that. She complains of pain when the hemorrhoid pops out and then like a blockage when the hemorrhoid goes back in.  Occasional blood when wiping.  She passes stool about 3 times a week.

## 2022-09-14 NOTE — PHYSICAL EXAM
[Normal Breath Sounds] : Normal breath sounds [Normal Heart Sounds] : normal heart sounds [Normal Rate and Rhythm] : normal rate and rhythm [No Rash or Lesion] : No rash or lesion [Alert] : alert [Oriented to Person] : oriented to person [Oriented to Place] : oriented to place [Oriented to Time] : oriented to time [Calm] : calm [Excoriation] : no perianal excoriation [Fistula] : no fistulas [Wart] : no warts [Ulcer ___ cm] : no ulcers [Normal] : was normal [None] : there was no rectal mass  [de-identified] : round, ND/NT, +BS [de-identified] : No external hemorrhoids [de-identified] : Palpable papilla in the left lateral position.  Anoscopy reveals long pedunculated papilla in the left lateral position, mildly enlarged internal hemorrhoids [de-identified] : well nourished female [de-identified] : NC/AT [de-identified] : DARA/+ROM [de-identified] : Intact

## 2022-09-14 NOTE — ASSESSMENT
[FreeTextEntry1] : The patient's symptoms are likely all from the hypertrophied papilla.  She was offered excision in the office today but wishes to have it done under anesthesia in the operating room.  Risks, benefits, and alternatives were discussed with her including but not limited to pain, bleeding, and infection.  She will continue to follow-up with Dr. Talavera for her constipation

## 2022-09-21 ENCOUNTER — APPOINTMENT (OUTPATIENT)
Dept: COLORECTAL SURGERY | Facility: CLINIC | Age: 47
End: 2022-09-21

## 2022-09-21 DIAGNOSIS — K62.89 OTHER SPECIFIED DISEASES OF ANUS AND RECTUM: ICD-10-CM

## 2022-09-21 PROCEDURE — 46220 EXCISE ANAL EXT TAG/PAPILLA: CPT

## 2022-09-21 PROCEDURE — 99213 OFFICE O/P EST LOW 20 MIN: CPT | Mod: 25

## 2022-09-21 NOTE — PHYSICAL EXAM
[Excoriation] : no perianal excoriation [Fistula] : no fistulas [Wart] : no warts [Ulcer ___ cm] : no ulcers [Normal] : was normal [None] : there was no rectal mass  [Normal Breath Sounds] : Normal breath sounds [Normal Heart Sounds] : normal heart sounds [Normal Rate and Rhythm] : normal rate and rhythm [No Rash or Lesion] : No rash or lesion [Alert] : alert [Oriented to Person] : oriented to person [Oriented to Place] : oriented to place [Oriented to Time] : oriented to time [Calm] : calm [de-identified] : round, ND/NT, +BS [de-identified] : No external hemorrhoids [de-identified] : Palpable papilla in the left lateral position.  Anoscopy reveals long pedunculated papilla in the left lateral position, mildly enlarged internal hemorrhoids [de-identified] : well nourished female [de-identified] : NC/AT [de-identified] : DARA/+ROM [de-identified] : Intact

## 2022-09-21 NOTE — PROCEDURE
[FreeTextEntry1] : 1% lidocaine with epinephrine mixed with 0.5% plain Marcaine infiltrated for local anesthesia.  The papilla is excised sharply and passed off for pathology.  Hemostasis obtained with Monsel's.  Patient tolerated it well

## 2022-09-29 LAB — CORE LAB BIOPSY: NORMAL

## 2022-09-29 RX ORDER — PANTOPRAZOLE 40 MG/1
40 TABLET, DELAYED RELEASE ORAL
Qty: 90 | Refills: 3 | Status: DISCONTINUED | COMMUNITY
Start: 2019-01-16 | End: 2022-09-29

## 2022-09-29 RX ORDER — METOPROLOL SUCCINATE 100 MG
100 TABLET, EXTENDED RELEASE 24 HR ORAL
Refills: 0 | Status: DISCONTINUED | COMMUNITY
End: 2022-09-29

## 2022-10-10 ENCOUNTER — APPOINTMENT (OUTPATIENT)
Dept: COLORECTAL SURGERY | Facility: HOSPITAL | Age: 47
End: 2022-10-10

## 2022-11-22 ENCOUNTER — APPOINTMENT (OUTPATIENT)
Dept: COLORECTAL SURGERY | Facility: CLINIC | Age: 47
End: 2022-11-22

## 2022-11-22 VITALS
DIASTOLIC BLOOD PRESSURE: 74 MMHG | HEART RATE: 76 BPM | WEIGHT: 179 LBS | SYSTOLIC BLOOD PRESSURE: 114 MMHG | HEIGHT: 63 IN | BODY MASS INDEX: 31.71 KG/M2 | TEMPERATURE: 98.2 F | OXYGEN SATURATION: 97 %

## 2022-11-22 PROCEDURE — 99213 OFFICE O/P EST LOW 20 MIN: CPT

## 2022-11-22 NOTE — ASSESSMENT
[FreeTextEntry1] : Will proceed with a trial of nystatin and Calmoseptine.  The patient will follow-up in 1 week if symptoms or not improved

## 2022-11-22 NOTE — PHYSICAL EXAM
[Fistula] : no fistulas [Wart] : no warts [Normal] : was normal [None] : there was no rectal mass  [Normal Breath Sounds] : Normal breath sounds [Normal Heart Sounds] : normal heart sounds [Normal Rate and Rhythm] : normal rate and rhythm [No Rash or Lesion] : No rash or lesion [Alert] : alert [Oriented to Person] : oriented to person [Oriented to Place] : oriented to place [Oriented to Time] : oriented to time [Calm] : calm [de-identified] : round, ND/NT, +BS [de-identified] : Subcentimeter area in the right posterior position with tiny ulceration.  No surrounding cellulitis.  Another punctate area in the right anterior position with tiny ulceration.  Otherwise lichenification of the skin circumferentially at the anal verge [de-identified] : well nourished female [de-identified] : NC/AT [de-identified] : DARA/+ROM [de-identified] : Intact

## 2022-11-22 NOTE — HISTORY OF PRESENT ILLNESS
[FreeTextEntry1] : The patient presents with a 1 week history of what she believes is a rash on the perianal skin.  She has tried topical hydrocortisone as well as Lotrisone without any relief.  She reports that there is another area closer to the anal verge that she feels has a cut.  She has been constipated and had a hard bowel movement preceding all of the above symptoms

## 2022-12-07 ENCOUNTER — APPOINTMENT (OUTPATIENT)
Dept: COLORECTAL SURGERY | Facility: CLINIC | Age: 47
End: 2022-12-07

## 2022-12-07 DIAGNOSIS — K64.4 RESIDUAL HEMORRHOIDAL SKIN TAGS: ICD-10-CM

## 2022-12-07 DIAGNOSIS — L29.0 PRURITUS ANI: ICD-10-CM

## 2022-12-07 PROCEDURE — 99213 OFFICE O/P EST LOW 20 MIN: CPT | Mod: 25

## 2022-12-07 PROCEDURE — 11106 INCAL BX SKN SINGLE LES: CPT

## 2022-12-07 NOTE — PROCEDURE
[FreeTextEntry1] : 1% lidocaine mixed with half percent Marcaine infiltrated for local anesthesia.  A small area of the hyperemic skin is excised sharply and passed off for pathology.  Hemostasis obtained with Monsel's.  Patient tolerated it well

## 2022-12-07 NOTE — HISTORY OF PRESENT ILLNESS
[FreeTextEntry1] : The patient presents with reports of resolution of her itching and pain.  She no longer has any perianal symptoms.  She only still notices an area of redness a little further out towards the buttocks that she wanted to have evaluated

## 2022-12-07 NOTE — PHYSICAL EXAM
[None] : no anal fissures seen [Fistula] : no fistulas [Wart] : no warts [Normal Breath Sounds] : Normal breath sounds [Normal Heart Sounds] : normal heart sounds [Normal Rate and Rhythm] : normal rate and rhythm [No Rash or Lesion] : No rash or lesion [Alert] : alert [Oriented to Person] : oriented to person [Oriented to Place] : oriented to place [Oriented to Time] : oriented to time [Calm] : calm [de-identified] : round, ND/NT, +BS [de-identified] : Skin at the anal verge appears completely normal and all previous lichenification has resolved.  Tiny 1 cm area in the right posterior position towards the gluteal cleft with mild hyperemia. [de-identified] : well nourished female [de-identified] : NC/AT [de-identified] : DARA/+ROM [de-identified] : Intact

## 2022-12-21 LAB — CORE LAB BIOPSY: NORMAL

## 2023-01-04 ENCOUNTER — APPOINTMENT (OUTPATIENT)
Dept: GASTROENTEROLOGY | Facility: CLINIC | Age: 48
End: 2023-01-04
Payer: MEDICARE

## 2023-01-04 VITALS
TEMPERATURE: 98.2 F | WEIGHT: 180 LBS | HEIGHT: 63 IN | SYSTOLIC BLOOD PRESSURE: 112 MMHG | DIASTOLIC BLOOD PRESSURE: 72 MMHG | OXYGEN SATURATION: 99 % | HEART RATE: 75 BPM | BODY MASS INDEX: 31.89 KG/M2

## 2023-01-04 DIAGNOSIS — R10.9 UNSPECIFIED ABDOMINAL PAIN: ICD-10-CM

## 2023-01-04 DIAGNOSIS — K58.2 MIXED IRRITABLE BOWEL SYNDROME: ICD-10-CM

## 2023-01-04 PROCEDURE — 99213 OFFICE O/P EST LOW 20 MIN: CPT

## 2023-01-04 RX ORDER — LINACLOTIDE 72 UG/1
72 CAPSULE, GELATIN COATED ORAL
Qty: 30 | Refills: 5 | Status: DISCONTINUED | COMMUNITY
Start: 2021-11-15 | End: 2023-01-04

## 2023-01-04 RX ORDER — DICYCLOMINE HYDROCHLORIDE 10 MG/1
10 CAPSULE ORAL EVERY 6 HOURS
Qty: 100 | Refills: 1 | Status: ACTIVE | COMMUNITY
Start: 2023-01-04 | End: 1900-01-01

## 2023-01-04 NOTE — PHYSICAL EXAM
[Alert] : alert [Normal Voice/Communication] : normal voice/communication [Healthy Appearing] : healthy appearing [No Acute Distress] : no acute distress [Sclera] : the sclera and conjunctiva were normal [Hearing Threshold Finger Rub Not [place]] : hearing was normal [Normal Lips/Gums] : the lips and gums were normal [Oropharynx] : the oropharynx was normal [Normal Appearance] : the appearance of the neck was normal [No Neck Mass] : no neck mass was observed [No Respiratory Distress] : no respiratory distress [No Acc Muscle Use] : no accessory muscle use [Respiration, Rhythm And Depth] : normal respiratory rhythm and effort [Auscultation Breath Sounds / Voice Sounds] : lungs were clear to auscultation bilaterally [Heart Rate And Rhythm] : heart rate was normal and rhythm regular [Normal S1, S2] : normal S1 and S2 [Murmurs] : no murmurs [Bowel Sounds] : normal bowel sounds [Abdomen Tenderness] : non-tender [No Masses] : no abdominal mass palpated [Abdomen Soft] : soft [] : no hepatosplenomegaly [Oriented To Time, Place, And Person] : oriented to person, place, and time

## 2023-01-04 NOTE — ASSESSMENT
[FreeTextEntry1] : This is a 47-year-old female with history of chronic GERD status post antireflux surgery and hiatal hernia repair, reporting some symptoms of dysphagia for both solids and liquids with occasional heartburn symptoms on famotidine 40 mg daily.  Esophageal motility testing in November 2021 with normal motility study by [place] classification.  However in upright and saltine swallows there is diminished peristalsis and absent peristalsis, suspect ineffective motility.  I recommend for her to eat small meals and chew her food well.  I recommend she continues on famotidine 40 mg nightly.  I recommend she continues to try to lose the weight.  She reports her constipation is better on regimen of Amitiza 24 mcg twice a day.  She has sometimes diarrhea with this but prefers to stay with this regimen.  She reports gas and bloating and occasional lower abdominal cramping.  She has benign abdominal exam today.

## 2023-01-04 NOTE — HISTORY OF PRESENT ILLNESS
[FreeTextEntry1] : Elvia presents for follow-up visit.  She reports that she needs to take her famotidine at bedtime for heartburn.  She denies dysphagia or odynophagia.  She underwent esophageal motility testing November 2021 showing normal Marcela study.  On upright and saltine swallows however, there is diminished peristalsis and absent peristalsis, suspected ineffective motility.  She also continues with constipation for which she is taking Amitiza 24 mcg twice a day with food.  She denies nausea with this.  She reports that there are days where she would have urgency to have bowel movements for the most part it helps with her constipation.  She prefers to stay on this regimen.  She is trying to lose weight.

## 2023-01-05 ENCOUNTER — APPOINTMENT (OUTPATIENT)
Dept: MAMMOGRAPHY | Facility: CLINIC | Age: 48
End: 2023-01-05
Payer: MEDICARE

## 2023-01-05 ENCOUNTER — APPOINTMENT (OUTPATIENT)
Dept: ULTRASOUND IMAGING | Facility: CLINIC | Age: 48
End: 2023-01-05
Payer: MEDICARE

## 2023-01-05 PROCEDURE — 76641 ULTRASOUND BREAST COMPLETE: CPT | Mod: 50

## 2023-01-05 PROCEDURE — 77063 BREAST TOMOSYNTHESIS BI: CPT

## 2023-01-05 PROCEDURE — 77067 SCR MAMMO BI INCL CAD: CPT

## 2023-01-24 ENCOUNTER — APPOINTMENT (OUTPATIENT)
Dept: ORTHOPEDIC SURGERY | Facility: CLINIC | Age: 48
End: 2023-01-24
Payer: MEDICARE

## 2023-01-24 VITALS — WEIGHT: 180 LBS | HEIGHT: 63 IN | BODY MASS INDEX: 31.89 KG/M2

## 2023-01-24 PROCEDURE — 73564 X-RAY EXAM KNEE 4 OR MORE: CPT | Mod: 50

## 2023-01-24 PROCEDURE — 99214 OFFICE O/P EST MOD 30 MIN: CPT

## 2023-01-24 RX ORDER — NYSTATIN 100000 [USP'U]/G
100000 CREAM TOPICAL TWICE DAILY
Qty: 1 | Refills: 2 | Status: DISCONTINUED | COMMUNITY
Start: 2022-11-22 | End: 2023-01-24

## 2023-02-28 ENCOUNTER — OUTPATIENT (OUTPATIENT)
Dept: OUTPATIENT SERVICES | Facility: HOSPITAL | Age: 48
LOS: 1 days | End: 2023-02-28
Payer: MEDICARE

## 2023-02-28 VITALS
WEIGHT: 175.05 LBS | DIASTOLIC BLOOD PRESSURE: 80 MMHG | SYSTOLIC BLOOD PRESSURE: 117 MMHG | OXYGEN SATURATION: 98 % | HEART RATE: 78 BPM | TEMPERATURE: 98 F | RESPIRATION RATE: 15 BRPM | HEIGHT: 64 IN

## 2023-02-28 DIAGNOSIS — S83.8X1A SPRAIN OF OTHER SPECIFIED PARTS OF RIGHT KNEE, INITIAL ENCOUNTER: ICD-10-CM

## 2023-02-28 DIAGNOSIS — Z98.890 OTHER SPECIFIED POSTPROCEDURAL STATES: Chronic | ICD-10-CM

## 2023-02-28 DIAGNOSIS — Z98.1 ARTHRODESIS STATUS: Chronic | ICD-10-CM

## 2023-02-28 DIAGNOSIS — Z01.818 ENCOUNTER FOR OTHER PREPROCEDURAL EXAMINATION: ICD-10-CM

## 2023-02-28 DIAGNOSIS — S83.249A OTHER TEAR OF MEDIAL MENISCUS, CURRENT INJURY, UNSPECIFIED KNEE, INITIAL ENCOUNTER: ICD-10-CM

## 2023-02-28 LAB
ALBUMIN SERPL ELPH-MCNC: 4.3 G/DL — SIGNIFICANT CHANGE UP (ref 3.3–5)
ALP SERPL-CCNC: 92 U/L — SIGNIFICANT CHANGE UP (ref 30–120)
ALT FLD-CCNC: 28 U/L DA — SIGNIFICANT CHANGE UP (ref 10–60)
ANION GAP SERPL CALC-SCNC: 7 MMOL/L — SIGNIFICANT CHANGE UP (ref 5–17)
AST SERPL-CCNC: 21 U/L — SIGNIFICANT CHANGE UP (ref 10–40)
BILIRUB SERPL-MCNC: 0.5 MG/DL — SIGNIFICANT CHANGE UP (ref 0.2–1.2)
BUN SERPL-MCNC: 15 MG/DL — SIGNIFICANT CHANGE UP (ref 7–23)
CALCIUM SERPL-MCNC: 9.8 MG/DL — SIGNIFICANT CHANGE UP (ref 8.4–10.5)
CHLORIDE SERPL-SCNC: 103 MMOL/L — SIGNIFICANT CHANGE UP (ref 96–108)
CO2 SERPL-SCNC: 28 MMOL/L — SIGNIFICANT CHANGE UP (ref 22–31)
CREAT SERPL-MCNC: 0.9 MG/DL — SIGNIFICANT CHANGE UP (ref 0.5–1.3)
EGFR: 78 ML/MIN/1.73M2 — SIGNIFICANT CHANGE UP
GLUCOSE SERPL-MCNC: 97 MG/DL — SIGNIFICANT CHANGE UP (ref 70–99)
HCT VFR BLD CALC: 36.7 % — SIGNIFICANT CHANGE UP (ref 34.5–45)
HGB BLD-MCNC: 12.4 G/DL — SIGNIFICANT CHANGE UP (ref 11.5–15.5)
MCHC RBC-ENTMCNC: 28.9 PG — SIGNIFICANT CHANGE UP (ref 27–34)
MCHC RBC-ENTMCNC: 33.8 GM/DL — SIGNIFICANT CHANGE UP (ref 32–36)
MCV RBC AUTO: 85.5 FL — SIGNIFICANT CHANGE UP (ref 80–100)
NRBC # BLD: 0 /100 WBCS — SIGNIFICANT CHANGE UP (ref 0–0)
PLATELET # BLD AUTO: 248 K/UL — SIGNIFICANT CHANGE UP (ref 150–400)
POTASSIUM SERPL-MCNC: 4.4 MMOL/L — SIGNIFICANT CHANGE UP (ref 3.5–5.3)
POTASSIUM SERPL-SCNC: 4.4 MMOL/L — SIGNIFICANT CHANGE UP (ref 3.5–5.3)
PROT SERPL-MCNC: 7.3 G/DL — SIGNIFICANT CHANGE UP (ref 6–8.3)
RBC # BLD: 4.29 M/UL — SIGNIFICANT CHANGE UP (ref 3.8–5.2)
RBC # FLD: 11.9 % — SIGNIFICANT CHANGE UP (ref 10.3–14.5)
SODIUM SERPL-SCNC: 138 MMOL/L — SIGNIFICANT CHANGE UP (ref 135–145)
WBC # BLD: 4.89 K/UL — SIGNIFICANT CHANGE UP (ref 3.8–10.5)
WBC # FLD AUTO: 4.89 K/UL — SIGNIFICANT CHANGE UP (ref 3.8–10.5)

## 2023-02-28 PROCEDURE — 93005 ELECTROCARDIOGRAM TRACING: CPT

## 2023-02-28 PROCEDURE — 80053 COMPREHEN METABOLIC PANEL: CPT

## 2023-02-28 PROCEDURE — 85027 COMPLETE CBC AUTOMATED: CPT

## 2023-02-28 PROCEDURE — G0463: CPT

## 2023-02-28 PROCEDURE — 93010 ELECTROCARDIOGRAM REPORT: CPT

## 2023-02-28 PROCEDURE — 36415 COLL VENOUS BLD VENIPUNCTURE: CPT

## 2023-02-28 RX ORDER — METOPROLOL TARTRATE 50 MG
1 TABLET ORAL
Qty: 0 | Refills: 0 | DISCHARGE

## 2023-02-28 RX ORDER — NORETHINDRONE AND ETHINYL ESTRADIOL 0.4-0.035
1 KIT ORAL
Qty: 0 | Refills: 0 | DISCHARGE

## 2023-02-28 RX ORDER — CARISOPRODOL 250 MG
1 TABLET ORAL
Qty: 0 | Refills: 0 | DISCHARGE

## 2023-02-28 RX ORDER — NORTRIPTYLINE HYDROCHLORIDE 10 MG/1
1 CAPSULE ORAL
Qty: 0 | Refills: 0 | DISCHARGE

## 2023-02-28 RX ORDER — PANTOPRAZOLE SODIUM 20 MG/1
1 TABLET, DELAYED RELEASE ORAL
Qty: 0 | Refills: 0 | DISCHARGE

## 2023-02-28 RX ORDER — HYDROCODONE BITARTRATE AND ACETAMINOPHEN 7.5; 325 MG/15ML; MG/15ML
1 SOLUTION ORAL
Qty: 0 | Refills: 0 | DISCHARGE

## 2023-02-28 RX ORDER — CELECOXIB 200 MG/1
1 CAPSULE ORAL
Qty: 0 | Refills: 0 | DISCHARGE

## 2023-02-28 NOTE — H&P PST ADULT - NSICDXFAMILYHX_GEN_ALL_CORE_FT
FAMILY HISTORY:  Mother  Still living? No  Family history of breast cancer, Age at diagnosis: Age Unknown  Family history of hypertension, Age at diagnosis: Age Unknown  Family history of type 2 diabetes mellitus, Age at diagnosis: Age Unknown

## 2023-02-28 NOTE — H&P PST ADULT - NSICDXPASTSURGICALHX_GEN_ALL_CORE_FT
PAST SURGICAL HISTORY:  History of cervical discectomy cervical discectomy and fusion C4- C7     History of ERCP     History of fusion of cervical spine     History of removal of ureteral stent     History of repair of hiatal hernia     S/P  Section ,    S/P cholecystectomy     S/P knee surgery arthroscopy X2- left knee    S/P lumpectomy of breast left 2016

## 2023-02-28 NOTE — H&P PST ADULT - PROBLEM SELECTOR PLAN 1
pt found in streets admits to etoh use no si or hi. denies fever. denies HA or neck pain. no chest pain or sob. no abd pain. no n/v/d. no urinary f/u/d. no back pain. no motor or sensory deficits. denies illicit drug use. . no other acute issues symptoms or concerns
Right knee arthroscopic medial meniscectomy is planned for 3/8/2023  Covid testing TBS 3//6/2023 @ [facility]  Diagnostic testing performed  pre op instructions were reviewed  best wishes offered

## 2023-02-28 NOTE — H&P PST ADULT - ASSESSMENT
49 yo female is scheduled for right knee arthroscopic medial meniscectomy on 3/8/2023 ,DirectAddress_Unknown

## 2023-02-28 NOTE — H&P PST ADULT - HISTORY OF PRESENT ILLNESS
49 yo female is scheduled for right knee arthroscopy medial meniscectomy on 3/8/2023 @ [facility].  She reports longstanding history of right knee pain with intermittent swelling of meniscal cyst.  Her pain is exacerbated when stair climbing and weight bearing rating 10/10 at worst.

## 2023-02-28 NOTE — H&P PST ADULT - MUSCULOSKELETAL
no joint erythema/no joint warmth/no calf tenderness/decreased ROM due to pain/no chest wall tenderness/back exam/extremities exam details…

## 2023-02-28 NOTE — H&P PST ADULT - NSICDXPASTMEDICALHX_GEN_ALL_CORE_FT
PAST MEDICAL HISTORY:  Chronic constipation     COVID-19 vaccination not done     GERD (gastroesophageal reflux disease)     H/O blood clots     IBS (irritable bowel syndrome)     Lumbar disc disease 3 bulging disc an d 2 herniated disc    Medial meniscus tear     Penicillin allergy     PONV (postoperative nausea and vomiting)     Radiculopathy     Renal stone     Right knee pain

## 2023-03-07 ENCOUNTER — TRANSCRIPTION ENCOUNTER (OUTPATIENT)
Age: 48
End: 2023-03-07

## 2023-03-07 LAB — SARS-COV-2 N GENE NPH QL NAA+PROBE: NOT DETECTED

## 2023-03-07 NOTE — ASU PATIENT PROFILE, ADULT - FALL HARM RISK - UNIVERSAL INTERVENTIONS
Bed in lowest position, wheels locked, appropriate side rails in place/Call bell, personal items and telephone in reach/Instruct patient to call for assistance before getting out of bed or chair/Non-slip footwear when patient is out of bed/[place] to call system/Physically safe environment - no spills, clutter or unnecessary equipment/Purposeful Proactive Rounding/Room/bathroom lighting operational, light cord in reach

## 2023-03-08 ENCOUNTER — APPOINTMENT (OUTPATIENT)
Dept: ORTHOPEDIC SURGERY | Facility: HOSPITAL | Age: 48
End: 2023-03-08

## 2023-03-08 ENCOUNTER — OUTPATIENT (OUTPATIENT)
Dept: OUTPATIENT SERVICES | Facility: HOSPITAL | Age: 48
LOS: 1 days | End: 2023-03-08
Payer: MEDICARE

## 2023-03-08 ENCOUNTER — TRANSCRIPTION ENCOUNTER (OUTPATIENT)
Age: 48
End: 2023-03-08

## 2023-03-08 VITALS
HEIGHT: 64 IN | DIASTOLIC BLOOD PRESSURE: 71 MMHG | OXYGEN SATURATION: 99 % | HEART RATE: 78 BPM | WEIGHT: 176.15 LBS | TEMPERATURE: 98 F | RESPIRATION RATE: 16 BRPM | SYSTOLIC BLOOD PRESSURE: 125 MMHG

## 2023-03-08 VITALS
OXYGEN SATURATION: 100 % | SYSTOLIC BLOOD PRESSURE: 107 MMHG | RESPIRATION RATE: 14 BRPM | DIASTOLIC BLOOD PRESSURE: 64 MMHG

## 2023-03-08 DIAGNOSIS — Z98.890 OTHER SPECIFIED POSTPROCEDURAL STATES: Chronic | ICD-10-CM

## 2023-03-08 DIAGNOSIS — Z98.1 ARTHRODESIS STATUS: Chronic | ICD-10-CM

## 2023-03-08 DIAGNOSIS — Z01.818 ENCOUNTER FOR OTHER PREPROCEDURAL EXAMINATION: ICD-10-CM

## 2023-03-08 DIAGNOSIS — S83.8X1A SPRAIN OF OTHER SPECIFIED PARTS OF RIGHT KNEE, INITIAL ENCOUNTER: ICD-10-CM

## 2023-03-08 LAB — HCG UR QL: NEGATIVE — SIGNIFICANT CHANGE UP

## 2023-03-08 PROCEDURE — 29881 ARTHRS KNE SRG MNISECTMY M/L: CPT | Mod: RT

## 2023-03-08 PROCEDURE — 81025 URINE PREGNANCY TEST: CPT

## 2023-03-08 PROCEDURE — 97161 PT EVAL LOW COMPLEX 20 MIN: CPT

## 2023-03-08 PROCEDURE — ZZZZZ: CPT

## 2023-03-08 RX ORDER — FENOFIBRATE,MICRONIZED 130 MG
1 CAPSULE ORAL
Qty: 0 | Refills: 0 | DISCHARGE

## 2023-03-08 RX ORDER — FAMOTIDINE 10 MG/ML
0 INJECTION INTRAVENOUS
Qty: 0 | Refills: 0 | DISCHARGE

## 2023-03-08 RX ORDER — APREPITANT 80 MG/1
40 CAPSULE ORAL ONCE
Refills: 0 | Status: COMPLETED | OUTPATIENT
Start: 2023-03-08 | End: 2023-03-08

## 2023-03-08 RX ORDER — LUBIPROSTONE 24 UG/1
1 CAPSULE, GELATIN COATED ORAL
Qty: 0 | Refills: 0 | DISCHARGE

## 2023-03-08 RX ORDER — CHLORHEXIDINE GLUCONATE 213 G/1000ML
1 SOLUTION TOPICAL ONCE
Refills: 0 | Status: COMPLETED | OUTPATIENT
Start: 2023-03-08 | End: 2023-03-08

## 2023-03-08 RX ORDER — METOPROLOL TARTRATE 50 MG
1 TABLET ORAL
Qty: 0 | Refills: 0 | DISCHARGE

## 2023-03-08 RX ORDER — OXYCODONE HYDROCHLORIDE 5 MG/1
5 TABLET ORAL ONCE
Refills: 0 | Status: DISCONTINUED | OUTPATIENT
Start: 2023-03-08 | End: 2023-03-09

## 2023-03-08 RX ORDER — CEFAZOLIN SODIUM 1 G
2000 VIAL (EA) INJECTION ONCE
Refills: 0 | Status: COMPLETED | OUTPATIENT
Start: 2023-03-08 | End: 2023-03-08

## 2023-03-08 RX ORDER — ONDANSETRON 8 MG/1
4 TABLET, FILM COATED ORAL ONCE
Refills: 0 | Status: DISCONTINUED | OUTPATIENT
Start: 2023-03-08 | End: 2023-03-09

## 2023-03-08 RX ORDER — OXYCODONE AND ACETAMINOPHEN 5; 325 MG/1; MG/1
1 TABLET ORAL
Qty: 15 | Refills: 0
Start: 2023-03-08

## 2023-03-08 RX ORDER — NORTRIPTYLINE HYDROCHLORIDE 10 MG/1
0 CAPSULE ORAL
Qty: 0 | Refills: 0 | DISCHARGE

## 2023-03-08 RX ORDER — SODIUM CHLORIDE 9 MG/ML
1000 INJECTION, SOLUTION INTRAVENOUS
Refills: 0 | Status: DISCONTINUED | OUTPATIENT
Start: 2023-03-08 | End: 2023-03-09

## 2023-03-08 RX ORDER — CELECOXIB 200 MG/1
0 CAPSULE ORAL
Qty: 0 | Refills: 0 | DISCHARGE

## 2023-03-08 RX ORDER — ATORVASTATIN CALCIUM 80 MG/1
1 TABLET, FILM COATED ORAL
Qty: 0 | Refills: 0 | DISCHARGE

## 2023-03-08 RX ORDER — CARISOPRODOL 250 MG
0 TABLET ORAL
Qty: 0 | Refills: 0 | DISCHARGE

## 2023-03-08 RX ORDER — HYDROMORPHONE HYDROCHLORIDE 2 MG/ML
0.25 INJECTION INTRAMUSCULAR; INTRAVENOUS; SUBCUTANEOUS
Refills: 0 | Status: DISCONTINUED | OUTPATIENT
Start: 2023-03-08 | End: 2023-03-09

## 2023-03-08 RX ADMIN — APREPITANT 40 MILLIGRAM(S): 80 CAPSULE ORAL at 06:56

## 2023-03-08 RX ADMIN — CHLORHEXIDINE GLUCONATE 1 APPLICATION(S): 213 SOLUTION TOPICAL at 06:56

## 2023-03-08 RX ADMIN — SODIUM CHLORIDE 100 MILLILITER(S): 9 INJECTION, SOLUTION INTRAVENOUS at 09:32

## 2023-03-08 NOTE — ASU DISCHARGE PLAN (ADULT/PEDIATRIC) - CARE PROVIDER_API CALL
Cesar Church)  Orthopaedic Sports Medicine; Orthopaedic Surgery  [address] [address]  Phone: [phone]  Fax: [phone]  Follow Up Time: 2 weeks

## 2023-03-08 NOTE — BRIEF OPERATIVE NOTE - NSICDXBRIEFPOSTOP_GEN_ALL_CORE_FT
POST-OP DIAGNOSIS:  Acute meniscal tear of knee, initial encounter 08-Mar-2023 09:10:26  Dee Buchanan

## 2023-03-08 NOTE — PHYSICAL THERAPY INITIAL EVALUATION ADULT - PLANNED THERAPY INTERVENTIONS, PT EVAL
Pt seen for transfer, ambulation training with Cane WBAT right LE. Pt given cane adjusted to correct height. Pt verbally educated on stair negotiation and given written instructions. Pt at safe functional level for d/c to home. No further needs for PT at this time.

## 2023-03-08 NOTE — ASU PREOP CHECKLIST - HEIGHT IN CM
Provider at bedside discussing results and plan to discharge home, all questions and concerns addressed, pt and spouse verbalize understanding of plan.     Franci Hewitt RN  10/25/17 2073     162.56

## 2023-03-08 NOTE — BRIEF OPERATIVE NOTE - NSICDXBRIEFPREOP_GEN_ALL_CORE_FT
PRE-OP DIAGNOSIS:  Acute meniscal tear of knee, initial encounter 08-Mar-2023 09:10:31 right Dee Buchanan

## 2023-03-08 NOTE — PHYSICAL THERAPY INITIAL EVALUATION ADULT - NSPTDMEREC_GEN_A_CORE
Nursing Assessment Note  No Phlebotomy needed  HCT 48      Date:  2018    Name:  Ashkan Valle   :  1947    Diagnosis: Primary D75.1 - Secondary polycythemia,  Diagnosed   (Active)    Treatment Regimen:     Phase Desc. is not available.    Last Treatment Date:  No past chemo visits    Toxicities:  There are no reported toxicities.    Vital Signs:  Performed on 2018 08:48  Height - 164.60 cms   Weight - 78.9 kg (HIGH)   BSA - 1.86 sq.m   BMI - 29.1200   Temperature - 98.4 F   Pulse - 67 /min   Respiration - 14 /min   BP - 130/81 mm(hg)   Pain - 0      Allergies:  No Known Allergies.  Allergies were reviewed.  No new allergies.    Medications:   The medication list was reviewed. No changes noted.        Note:   HCT 48 no phlebotomy needed today   Next appointment 1 month   Questions were answered and understanding was verbalized.  Electronically signed by,    Silvana Hicks        straight cane

## 2023-03-08 NOTE — ASU DISCHARGE PLAN (ADULT/PEDIATRIC) - NS MD DC FALL RISK RISK
2/14/2017      RE: Deacon Ben Medina  [address]  [facility] [address]       HISTORY OF PRESENT ILLNESS:  I am seeing  back in the Pediatric [facility]ology [facility] today.  He is 2-1/2-year-old male who had tubes placed about one and one-half years ago.  I last saw him three weeks ago, at which point he was having significant obstruction after the tube extruded.  He did not tolerate cleaning of his ears at that point and we opted to do some drops for a couple weeks.  His ears were examined under the binocular microscope today.        PHYSICAL EXAMINATION:  The left ear the tube had extruded.  It was removed using an alligator.  It revealed a normal tympanic membrane.  Under the right ear it did take a ring curette, but we were able to remove a significantly obstructed ear canal.  It revealed that the tympanic membrane was intact.  He tolerated the procedure.      ASSESSMENT AND PLAN:   is a 2-1/2-year-old male whose tubes have now extruded.  I am going to get a hearing test today.  If it looks normal then I only need to see him back as needed. If hearing test is not normal, then we'll schedule a follow up appointment.      Thank you for allowing me to participate in his care.     Cleo Tafoya MD     cc:  Judith Roblero MD      [address]  [address]        [email or web address]          For information on Fall & Injury Prevention, visit: [email or web address] OR  [email or web address] OR  https://www.cdc.gov/steadi/patient.html

## 2023-03-08 NOTE — PHYSICAL THERAPY INITIAL EVALUATION ADULT - PERTINENT HX OF CURRENT PROBLEM, REHAB EVAL
49 yo female is scheduled for right knee arthroscopy medial meniscectomy on 3/8/2023 @ [facility].  She reports longstanding history of right knee pain with intermittent swelling of meniscal cyst.  Her pain is exacerbated when stair climbing and weight bearing rating 10/10 at worst.

## 2023-03-10 PROBLEM — Z28.9 IMMUNIZATION NOT CARRIED OUT FOR UNSPECIFIED REASON: Chronic | Status: ACTIVE | Noted: 2023-02-28

## 2023-03-10 PROBLEM — Z86.718 PERSONAL HISTORY OF OTHER VENOUS THROMBOSIS AND EMBOLISM: Chronic | Status: ACTIVE | Noted: 2023-02-28

## 2023-03-10 PROBLEM — M25.561 PAIN IN RIGHT KNEE: Chronic | Status: ACTIVE | Noted: 2023-02-28

## 2023-03-10 PROBLEM — R11.2 NAUSEA WITH VOMITING, UNSPECIFIED: Chronic | Status: ACTIVE | Noted: 2023-02-28

## 2023-03-10 PROBLEM — S83.249A OTHER TEAR OF MEDIAL MENISCUS, CURRENT INJURY, UNSPECIFIED KNEE, INITIAL ENCOUNTER: Chronic | Status: ACTIVE | Noted: 2023-02-28

## 2023-03-10 PROBLEM — K59.09 OTHER CONSTIPATION: Chronic | Status: ACTIVE | Noted: 2023-02-28

## 2023-03-10 PROBLEM — Z88.0 ALLERGY STATUS TO PENICILLIN: Chronic | Status: ACTIVE | Noted: 2023-02-28

## 2023-03-17 ENCOUNTER — APPOINTMENT (OUTPATIENT)
Dept: ORTHOPEDIC SURGERY | Facility: CLINIC | Age: 48
End: 2023-03-17
Payer: MEDICARE

## 2023-03-17 PROCEDURE — 99024 POSTOP FOLLOW-UP VISIT: CPT

## 2023-04-18 ENCOUNTER — APPOINTMENT (OUTPATIENT)
Dept: ORTHOPEDIC SURGERY | Facility: CLINIC | Age: 48
End: 2023-04-18
Payer: MEDICARE

## 2023-04-18 DIAGNOSIS — M17.11 UNILATERAL PRIMARY OSTEOARTHRITIS, RIGHT KNEE: ICD-10-CM

## 2023-04-18 PROCEDURE — 73564 X-RAY EXAM KNEE 4 OR MORE: CPT | Mod: RT

## 2023-04-18 PROCEDURE — 99024 POSTOP FOLLOW-UP VISIT: CPT

## 2023-05-30 ENCOUNTER — APPOINTMENT (OUTPATIENT)
Dept: ORTHOPEDIC SURGERY | Facility: CLINIC | Age: 48
End: 2023-05-30
Payer: MEDICARE

## 2023-05-30 VITALS — HEIGHT: 63 IN | BODY MASS INDEX: 31.01 KG/M2 | WEIGHT: 175 LBS

## 2023-05-30 DIAGNOSIS — S83.8X1A SPRAIN OF OTHER SPECIFIED PARTS OF RIGHT KNEE, INITIAL ENCOUNTER: ICD-10-CM

## 2023-05-30 PROCEDURE — 99024 POSTOP FOLLOW-UP VISIT: CPT

## 2023-06-27 ENCOUNTER — APPOINTMENT (OUTPATIENT)
Dept: ORTHOPEDIC SURGERY | Facility: CLINIC | Age: 48
End: 2023-06-27
Payer: MEDICARE

## 2023-06-27 VITALS — BODY MASS INDEX: 31.01 KG/M2 | HEIGHT: 63 IN | WEIGHT: 175 LBS

## 2023-06-27 DIAGNOSIS — M25.811 OTHER SPECIFIED JOINT DISORDERS, RIGHT SHOULDER: ICD-10-CM

## 2023-06-27 PROCEDURE — 73030 X-RAY EXAM OF SHOULDER: CPT | Mod: RT

## 2023-06-27 PROCEDURE — 99214 OFFICE O/P EST MOD 30 MIN: CPT

## 2023-12-19 ENCOUNTER — APPOINTMENT (OUTPATIENT)
Dept: ORTHOPEDIC SURGERY | Facility: CLINIC | Age: 48
End: 2023-12-19
Payer: MEDICARE

## 2023-12-19 VITALS — HEIGHT: 63 IN | WEIGHT: 169 LBS | BODY MASS INDEX: 29.95 KG/M2

## 2023-12-19 DIAGNOSIS — M17.12 UNILATERAL PRIMARY OSTEOARTHRITIS, LEFT KNEE: ICD-10-CM

## 2023-12-19 DIAGNOSIS — M22.2X2 PATELLOFEMORAL DISORDERS, LEFT KNEE: ICD-10-CM

## 2023-12-19 PROCEDURE — 99214 OFFICE O/P EST MOD 30 MIN: CPT

## 2023-12-19 PROCEDURE — 73564 X-RAY EXAM KNEE 4 OR MORE: CPT | Mod: 26,LT

## 2023-12-29 ENCOUNTER — RX RENEWAL (OUTPATIENT)
Age: 48
End: 2023-12-29

## 2024-01-03 ENCOUNTER — APPOINTMENT (OUTPATIENT)
Dept: GASTROENTEROLOGY | Facility: CLINIC | Age: 49
End: 2024-01-03
Payer: MEDICARE

## 2024-01-03 VITALS
BODY MASS INDEX: 30.11 KG/M2 | HEART RATE: 76 BPM | SYSTOLIC BLOOD PRESSURE: 122 MMHG | WEIGHT: 170 LBS | OXYGEN SATURATION: 96 % | DIASTOLIC BLOOD PRESSURE: 79 MMHG

## 2024-01-03 DIAGNOSIS — K21.9 GASTRO-ESOPHAGEAL REFLUX DISEASE W/OUT ESOPHAGITIS: ICD-10-CM

## 2024-01-03 DIAGNOSIS — Z86.010 PERSONAL HISTORY OF COLONIC POLYPS: ICD-10-CM

## 2024-01-03 PROCEDURE — 99214 OFFICE O/P EST MOD 30 MIN: CPT

## 2024-01-03 RX ORDER — FAMOTIDINE 40 MG/1
40 TABLET, FILM COATED ORAL
Qty: 90 | Refills: 3 | Status: ACTIVE | COMMUNITY
Start: 2021-01-29 | End: 1900-01-01

## 2024-01-03 RX ORDER — SODIUM SULFATE, POTASSIUM SULFATE AND MAGNESIUM SULFATE 1.6; 3.13; 17.5 G/177ML; G/177ML; G/177ML
17.5-3.13-1.6 SOLUTION ORAL
Qty: 1 | Refills: 0 | Status: ACTIVE | COMMUNITY
Start: 2024-01-03 | End: 1900-01-01

## 2024-01-03 RX ORDER — LUBIPROSTONE 24 UG/1
24 CAPSULE ORAL TWICE DAILY
Qty: 180 | Refills: 3 | Status: ACTIVE | COMMUNITY
Start: 2022-08-24 | End: 1900-01-01

## 2024-01-03 NOTE — HISTORY OF PRESENT ILLNESS
[FreeTextEntry1] : Lois presents for follow-up visit.  She reports continued heartburn symptoms on famotidine 40 mg at bedtime.  She was recently noted to have a small hiatal hernia.  She had a hiatal hernia repair in the past.  She denies dysphagia or odynophagia.  For the constipation, she takes Amitiza 24 mcg twice a day.  No rectal bleeding or melena.  Last upper endoscopy in 2021 with reflux esophagitis and colonoscopy with adenomatous colon polyp.

## 2024-01-03 NOTE — REASON FOR VISIT
[Follow-up] : a follow-up of an existing diagnosis [FreeTextEntry1] : GERD, hx of adenomatous colon polyp

## 2024-01-03 NOTE — ASSESSMENT
[FreeTextEntry1] : Is a 48-year-old female with chronic GERD, hiatal hernia, status post hiatal hernia repair with continued GERD symptoms.  I recommend continuation of famotidine.  I recommend upper endoscopy to rule out complications of chronic GERD.  At the same time we will perform surveillance colonoscopy.  I explained to her risk benefits of both procedures.

## 2024-01-03 NOTE — PHYSICAL EXAM
[Alert] : alert [Normal Voice/Communication] : normal voice/communication [Healthy Appearing] : healthy appearing [No Acute Distress] : no acute distress [Sclera] : the sclera and conjunctiva were normal [Hearing Threshold Finger Rub Not [place]] : hearing was normal [Normal Lips/Gums] : the lips and gums were normal [Oropharynx] : the oropharynx was normal [Normal Appearance] : the appearance of the neck was normal [No Neck Mass] : no neck mass was observed [No Respiratory Distress] : no respiratory distress [No Acc Muscle Use] : no accessory muscle use [Respiration, Rhythm And Depth] : normal respiratory rhythm and effort [Auscultation Breath Sounds / Voice Sounds] : lungs were clear to auscultation bilaterally [Heart Rate And Rhythm] : heart rate was normal and rhythm regular [Normal S1, S2] : normal S1 and S2 [Murmurs] : no murmurs [Bowel Sounds] : normal bowel sounds [Abdomen Tenderness] : non-tender [No Masses] : no abdominal mass palpated [Abdomen Soft] : soft [] : no hepatosplenomegaly [Oriented To Time, Place, And Person] : oriented to person, place, and time

## 2024-02-13 ENCOUNTER — APPOINTMENT (OUTPATIENT)
Dept: GASTROENTEROLOGY | Facility: AMBULATORY MEDICAL SERVICES | Age: 49
End: 2024-02-13

## 2024-04-08 ENCOUNTER — NON-APPOINTMENT (OUTPATIENT)
Age: 49
End: 2024-04-08

## 2024-04-08 RX ORDER — HYDROCORTISONE 25 MG/G
2.5 CREAM TOPICAL
Qty: 30 | Refills: 3 | Status: ACTIVE | COMMUNITY
Start: 2024-04-08 | End: 1900-01-01

## 2024-04-12 ENCOUNTER — APPOINTMENT (OUTPATIENT)
Dept: COLORECTAL SURGERY | Facility: CLINIC | Age: 49
End: 2024-04-12
Payer: MEDICARE

## 2024-04-12 DIAGNOSIS — K64.5 PERIANAL VENOUS THROMBOSIS: ICD-10-CM

## 2024-04-12 PROCEDURE — 99213 OFFICE O/P EST LOW 20 MIN: CPT

## 2024-04-12 NOTE — HISTORY OF PRESENT ILLNESS
[FreeTextEntry1] : The patient presents with a 2-week history of an acute painful swelling at the anal verge.  She reports that both the pain and the swelling are significantly improved compared to when it began 2 weeks ago but are not completely resolved yet.  She has a history of IBS with alternating constipation and diarrhea.

## 2024-04-12 NOTE — PHYSICAL EXAM
[None] : no anal fissures seen [Fistula] : no fistulas [Wart] : no warts [Normal Breath Sounds] : Normal breath sounds [Normal Heart Sounds] : normal heart sounds [Normal Rate and Rhythm] : normal rate and rhythm [No Rash or Lesion] : No rash or lesion [Alert] : alert [Oriented to Person] : oriented to person [Oriented to Place] : oriented to place [Oriented to Time] : oriented to time [Calm] : calm [de-identified] : round, ND/NT, +BS [de-identified] : Right posterior small thrombosed hemorrhoid [de-identified] : well nourished female [de-identified] : NC/AT [de-identified] : DARA/+ROM [de-identified] : Intact

## 2024-04-12 NOTE — ASSESSMENT
[FreeTextEntry1] : The patient is already improving so I do not believe any surgical intervention is required.  Will give course of topical nifedipine to help the thrombus resolve faster.  Sitz bath 3 times daily Follow-up as needed

## 2024-04-30 ENCOUNTER — APPOINTMENT (OUTPATIENT)
Dept: GASTROENTEROLOGY | Facility: AMBULATORY MEDICAL SERVICES | Age: 49
End: 2024-04-30
Payer: MEDICARE

## 2024-04-30 PROCEDURE — 43239 EGD BIOPSY SINGLE/MULTIPLE: CPT

## 2024-04-30 PROCEDURE — 45378 DIAGNOSTIC COLONOSCOPY: CPT | Mod: PT

## 2025-03-23 NOTE — H&P PST ADULT - NEGATIVE GENERAL SYMPTOMS
Patient/Caregiver provided printed discharge information.
no polyuria/no malaise/no fatigue/no weight gain/no polydipsia/no sweating/no anorexia/no weight loss/no polyphagia/no fever/no chills

## 2025-05-06 ENCOUNTER — APPOINTMENT (OUTPATIENT)
Dept: GASTROENTEROLOGY | Facility: CLINIC | Age: 50
End: 2025-05-06
Payer: MEDICARE

## 2025-05-06 DIAGNOSIS — K21.9 GASTRO-ESOPHAGEAL REFLUX DISEASE W/OUT ESOPHAGITIS: ICD-10-CM

## 2025-05-06 PROCEDURE — 99213 OFFICE O/P EST LOW 20 MIN: CPT | Mod: 93

## 2025-05-06 RX ORDER — OMEPRAZOLE 20 MG/1
20 CAPSULE, DELAYED RELEASE ORAL
Qty: 90 | Refills: 0 | Status: ACTIVE | COMMUNITY
Start: 2025-05-06 | End: 1900-01-01

## (undated) DEVICE — ARTHREX SCORPION NEEDLE KNEE

## (undated) DEVICE — POSITIONER STRAP ARMBOARD VELCRO TS-30

## (undated) DEVICE — TUBING SET GRAVITY 4 SPIKE

## (undated) DEVICE — PACK KNEE ARTHROSCOPY

## (undated) DEVICE — SUT MONOSOF 4-0 18" C-13

## (undated) DEVICE — S&N ARTHROCARE WAND COBLATION WEREWOLF FLOW 90

## (undated) DEVICE — DRAPE 3/4 SHEET 52X76"

## (undated) DEVICE — WARMING BLANKET UPPER ADULT

## (undated) DEVICE — SUT TIGERSTICK TIGERWIRE NUMBER 2

## (undated) DEVICE — ELCTR BUTTON SWITCH W EDGE COATED BLADE 3MM

## (undated) DEVICE — LAP PAD W RING 18 X 18"

## (undated) DEVICE — S&N ARTHROCARE WAND COBLATION WEREWOLF FLOW 50

## (undated) DEVICE — ELCTR GROUNDING PAD ADULT COVIDIEN

## (undated) DEVICE — SOLIDIFIER CANN EXPRESS 3K

## (undated) DEVICE — POSITIONER STIRRUP STRAP W SLIP RING 19X3.5"

## (undated) DEVICE — VENODYNE/SCD SLEEVE CALF MEDIUM

## (undated) DEVICE — SOL IRR BAG NS 0.9% 3000ML

## (undated) DEVICE — POSITIONER PATIENT SAFETY STRAP 3X60"

## (undated) DEVICE — SHAVER BLADE LINVATEC ULTRAFRR 3.5MM

## (undated) DEVICE — DVC SUCTION FLR PUDDLE GUPPY